# Patient Record
Sex: FEMALE | Race: WHITE | NOT HISPANIC OR LATINO | Employment: FULL TIME | URBAN - METROPOLITAN AREA
[De-identification: names, ages, dates, MRNs, and addresses within clinical notes are randomized per-mention and may not be internally consistent; named-entity substitution may affect disease eponyms.]

---

## 2017-03-29 ENCOUNTER — OFFICE VISIT - HEALTHEAST (OUTPATIENT)
Dept: MIDWIFE SERVICES | Facility: CLINIC | Age: 32
End: 2017-03-29

## 2017-03-29 DIAGNOSIS — Z3A.01 LESS THAN 8 WEEKS GESTATION OF PREGNANCY: ICD-10-CM

## 2017-03-29 DIAGNOSIS — N92.6 MISSED MENSES: ICD-10-CM

## 2017-03-29 ASSESSMENT — MIFFLIN-ST. JEOR: SCORE: 1211.48

## 2017-03-30 ENCOUNTER — COMMUNICATION - HEALTHEAST (OUTPATIENT)
Dept: MIDWIFE SERVICES | Facility: CLINIC | Age: 32
End: 2017-03-30

## 2017-03-30 ENCOUNTER — AMBULATORY - HEALTHEAST (OUTPATIENT)
Dept: MIDWIFE SERVICES | Facility: CLINIC | Age: 32
End: 2017-03-30

## 2017-03-30 ENCOUNTER — COMMUNICATION - HEALTHEAST (OUTPATIENT)
Dept: ADMINISTRATIVE | Facility: CLINIC | Age: 32
End: 2017-03-30

## 2017-03-30 DIAGNOSIS — Z34.90 PREGNANT: ICD-10-CM

## 2017-04-07 ENCOUNTER — AMBULATORY - HEALTHEAST (OUTPATIENT)
Dept: MIDWIFE SERVICES | Facility: CLINIC | Age: 32
End: 2017-04-07

## 2017-04-07 ENCOUNTER — HOSPITAL ENCOUNTER (OUTPATIENT)
Dept: ULTRASOUND IMAGING | Facility: CLINIC | Age: 32
Discharge: HOME | End: 2017-04-07
Attending: ADVANCED PRACTICE MIDWIFE

## 2017-04-07 ENCOUNTER — COMMUNICATION - HEALTHEAST (OUTPATIENT)
Dept: MIDWIFE SERVICES | Facility: CLINIC | Age: 32
End: 2017-04-07

## 2017-04-07 DIAGNOSIS — Z34.90 EARLY STAGE OF PREGNANCY: ICD-10-CM

## 2017-04-07 DIAGNOSIS — Z34.90 PREGNANT: ICD-10-CM

## 2017-04-07 DIAGNOSIS — Z33.1 PREGNANT STATE, INCIDENTAL: ICD-10-CM

## 2017-04-11 ENCOUNTER — AMBULATORY - HEALTHEAST (OUTPATIENT)
Dept: LAB | Facility: CLINIC | Age: 32
End: 2017-04-11

## 2017-04-11 ENCOUNTER — AMBULATORY - HEALTHEAST (OUTPATIENT)
Dept: MIDWIFE SERVICES | Facility: CLINIC | Age: 32
End: 2017-04-11

## 2017-04-11 ENCOUNTER — COMMUNICATION - HEALTHEAST (OUTPATIENT)
Dept: MIDWIFE SERVICES | Facility: CLINIC | Age: 32
End: 2017-04-11

## 2017-04-11 DIAGNOSIS — O20.9 VAGINAL BLEEDING IN PREGNANCY, FIRST TRIMESTER: ICD-10-CM

## 2017-04-14 ENCOUNTER — COMMUNICATION - HEALTHEAST (OUTPATIENT)
Dept: OBGYN | Facility: CLINIC | Age: 32
End: 2017-04-14

## 2017-04-17 ENCOUNTER — AMBULATORY - HEALTHEAST (OUTPATIENT)
Dept: LAB | Facility: CLINIC | Age: 32
End: 2017-04-17

## 2017-04-17 ENCOUNTER — COMMUNICATION - HEALTHEAST (OUTPATIENT)
Dept: OBGYN | Facility: CLINIC | Age: 32
End: 2017-04-17

## 2017-04-17 ENCOUNTER — HOSPITAL ENCOUNTER (OUTPATIENT)
Dept: ULTRASOUND IMAGING | Facility: CLINIC | Age: 32
Discharge: HOME | End: 2017-04-17
Attending: ADVANCED PRACTICE MIDWIFE

## 2017-04-17 ENCOUNTER — AMBULATORY - HEALTHEAST (OUTPATIENT)
Dept: OBGYN | Facility: CLINIC | Age: 32
End: 2017-04-17

## 2017-04-17 DIAGNOSIS — O02.1 MISSED ABORTION: ICD-10-CM

## 2017-04-17 DIAGNOSIS — Z34.90 EARLY STAGE OF PREGNANCY: ICD-10-CM

## 2017-04-17 DIAGNOSIS — Z33.1 PREGNANT STATE, INCIDENTAL: ICD-10-CM

## 2017-04-21 ENCOUNTER — COMMUNICATION - HEALTHEAST (OUTPATIENT)
Dept: MIDWIFE SERVICES | Facility: CLINIC | Age: 32
End: 2017-04-21

## 2017-04-22 ENCOUNTER — COMMUNICATION - HEALTHEAST (OUTPATIENT)
Dept: OBGYN | Facility: CLINIC | Age: 32
End: 2017-04-22

## 2017-04-27 ENCOUNTER — AMBULATORY - HEALTHEAST (OUTPATIENT)
Dept: LAB | Facility: CLINIC | Age: 32
End: 2017-04-27

## 2017-04-27 DIAGNOSIS — O20.9 VAGINAL BLEEDING IN PREGNANCY, FIRST TRIMESTER: ICD-10-CM

## 2017-04-29 ENCOUNTER — COMMUNICATION - HEALTHEAST (OUTPATIENT)
Dept: MIDWIFE SERVICES | Facility: CLINIC | Age: 32
End: 2017-04-29

## 2017-05-04 ENCOUNTER — AMBULATORY - HEALTHEAST (OUTPATIENT)
Dept: LAB | Facility: CLINIC | Age: 32
End: 2017-05-04

## 2017-05-04 DIAGNOSIS — O02.1 MISSED ABORTION: ICD-10-CM

## 2017-05-05 ENCOUNTER — COMMUNICATION - HEALTHEAST (OUTPATIENT)
Dept: MIDWIFE SERVICES | Facility: CLINIC | Age: 32
End: 2017-05-05

## 2017-05-05 ENCOUNTER — COMMUNICATION - HEALTHEAST (OUTPATIENT)
Dept: OBGYN | Facility: CLINIC | Age: 32
End: 2017-05-05

## 2017-05-06 ENCOUNTER — COMMUNICATION - HEALTHEAST (OUTPATIENT)
Dept: OBGYN | Facility: CLINIC | Age: 32
End: 2017-05-06

## 2017-05-06 ENCOUNTER — AMBULATORY - HEALTHEAST (OUTPATIENT)
Dept: OBGYN | Facility: CLINIC | Age: 32
End: 2017-05-06

## 2017-05-08 ENCOUNTER — COMMUNICATION - HEALTHEAST (OUTPATIENT)
Dept: OBGYN | Facility: CLINIC | Age: 32
End: 2017-05-08

## 2017-05-08 ENCOUNTER — AMBULATORY - HEALTHEAST (OUTPATIENT)
Dept: OBGYN | Facility: CLINIC | Age: 32
End: 2017-05-08

## 2017-05-08 DIAGNOSIS — O03.9 MISCARRIAGE: ICD-10-CM

## 2017-05-12 ENCOUNTER — AMBULATORY - HEALTHEAST (OUTPATIENT)
Dept: LAB | Facility: CLINIC | Age: 32
End: 2017-05-12

## 2017-05-12 DIAGNOSIS — O03.9 MISCARRIAGE: ICD-10-CM

## 2017-05-23 ENCOUNTER — AMBULATORY - HEALTHEAST (OUTPATIENT)
Dept: OBGYN | Facility: CLINIC | Age: 32
End: 2017-05-23

## 2017-05-23 DIAGNOSIS — O03.9 MISCARRIAGE: ICD-10-CM

## 2017-05-26 ENCOUNTER — AMBULATORY - HEALTHEAST (OUTPATIENT)
Dept: LAB | Facility: CLINIC | Age: 32
End: 2017-05-26

## 2017-05-26 ENCOUNTER — OFFICE VISIT - HEALTHEAST (OUTPATIENT)
Dept: MIDWIFE SERVICES | Facility: CLINIC | Age: 32
End: 2017-05-26

## 2017-05-26 ENCOUNTER — COMMUNICATION - HEALTHEAST (OUTPATIENT)
Dept: MIDWIFE SERVICES | Facility: CLINIC | Age: 32
End: 2017-05-26

## 2017-05-26 DIAGNOSIS — O03.9 MISCARRIAGE: ICD-10-CM

## 2017-05-26 ASSESSMENT — MIFFLIN-ST. JEOR: SCORE: 1216.59

## 2017-07-31 ENCOUNTER — COMMUNICATION - HEALTHEAST (OUTPATIENT)
Dept: OBGYN | Facility: CLINIC | Age: 32
End: 2017-07-31

## 2017-08-29 ENCOUNTER — COMMUNICATION - HEALTHEAST (OUTPATIENT)
Dept: OBGYN | Facility: CLINIC | Age: 32
End: 2017-08-29

## 2017-10-18 ENCOUNTER — COMMUNICATION - HEALTHEAST (OUTPATIENT)
Dept: OBGYN | Facility: CLINIC | Age: 32
End: 2017-10-18

## 2017-10-19 ENCOUNTER — OFFICE VISIT - HEALTHEAST (OUTPATIENT)
Dept: MIDWIFE SERVICES | Facility: CLINIC | Age: 32
End: 2017-10-19

## 2017-10-19 DIAGNOSIS — O20.9 VAGINAL BLEEDING IN PREGNANCY, FIRST TRIMESTER: ICD-10-CM

## 2017-10-19 DIAGNOSIS — Z32.01 PREGNANCY EXAMINATION OR TEST, POSITIVE RESULT: ICD-10-CM

## 2017-10-19 DIAGNOSIS — N92.6 MISSED MENSES: ICD-10-CM

## 2017-10-19 DIAGNOSIS — N92.6 IRREGULAR MENSES: ICD-10-CM

## 2017-10-19 ASSESSMENT — MIFFLIN-ST. JEOR: SCORE: 1207.52

## 2017-10-22 ENCOUNTER — COMMUNICATION - HEALTHEAST (OUTPATIENT)
Dept: MIDWIFE SERVICES | Facility: CLINIC | Age: 32
End: 2017-10-22

## 2017-10-26 ENCOUNTER — HOSPITAL ENCOUNTER (OUTPATIENT)
Dept: ULTRASOUND IMAGING | Facility: CLINIC | Age: 32
Discharge: HOME | End: 2017-10-26
Attending: ADVANCED PRACTICE MIDWIFE

## 2017-10-26 DIAGNOSIS — O20.9 VAGINAL BLEEDING IN PREGNANCY, FIRST TRIMESTER: ICD-10-CM

## 2017-10-26 DIAGNOSIS — N92.6 IRREGULAR MENSES: ICD-10-CM

## 2017-10-26 DIAGNOSIS — N92.6 MISSED MENSES: ICD-10-CM

## 2017-10-26 DIAGNOSIS — Z32.01 PREGNANCY EXAMINATION OR TEST, POSITIVE RESULT: ICD-10-CM

## 2017-10-30 ENCOUNTER — COMMUNICATION - HEALTHEAST (OUTPATIENT)
Dept: MIDWIFE SERVICES | Facility: CLINIC | Age: 32
End: 2017-10-30

## 2017-11-09 ENCOUNTER — PRENATAL OFFICE VISIT - HEALTHEAST (OUTPATIENT)
Dept: MIDWIFE SERVICES | Facility: CLINIC | Age: 32
End: 2017-11-09

## 2017-11-09 ENCOUNTER — COMMUNICATION - HEALTHEAST (OUTPATIENT)
Dept: MIDWIFE SERVICES | Facility: CLINIC | Age: 32
End: 2017-11-09

## 2017-11-09 DIAGNOSIS — Z34.00 SUPERVISION OF NORMAL FIRST PREGNANCY: ICD-10-CM

## 2017-11-09 DIAGNOSIS — Z34.01 ENCOUNTER FOR SUPERVISION OF NORMAL FIRST PREGNANCY IN FIRST TRIMESTER: ICD-10-CM

## 2017-11-09 DIAGNOSIS — N93.0 PCB (POST COITAL BLEEDING): ICD-10-CM

## 2017-11-09 LAB — HIV 1+2 AB+HIV1 P24 AG SERPL QL IA: NEGATIVE

## 2017-11-09 ASSESSMENT — MIFFLIN-ST. JEOR: SCORE: 1216.59

## 2017-11-10 LAB
HBV SURFACE AG SERPL QL IA: NEGATIVE
HCV AB SERPL QL IA: NEGATIVE
SYPHILIS RPR SCREEN - HISTORICAL: NORMAL

## 2017-12-12 ENCOUNTER — PRENATAL OFFICE VISIT - HEALTHEAST (OUTPATIENT)
Dept: MIDWIFE SERVICES | Facility: CLINIC | Age: 32
End: 2017-12-12

## 2017-12-12 DIAGNOSIS — Z34.01 ENCOUNTER FOR SUPERVISION OF NORMAL FIRST PREGNANCY IN FIRST TRIMESTER: ICD-10-CM

## 2017-12-12 DIAGNOSIS — Z00.00 HEALTHCARE MAINTENANCE: ICD-10-CM

## 2017-12-12 ASSESSMENT — MIFFLIN-ST. JEOR: SCORE: 1223.39

## 2018-01-10 ENCOUNTER — COMMUNICATION - HEALTHEAST (OUTPATIENT)
Dept: ADMINISTRATIVE | Facility: CLINIC | Age: 33
End: 2018-01-10

## 2018-01-11 ENCOUNTER — PRENATAL OFFICE VISIT - HEALTHEAST (OUTPATIENT)
Dept: MIDWIFE SERVICES | Facility: CLINIC | Age: 33
End: 2018-01-11

## 2018-01-11 DIAGNOSIS — Z34.01 ENCOUNTER FOR SUPERVISION OF NORMAL FIRST PREGNANCY IN FIRST TRIMESTER: ICD-10-CM

## 2018-01-11 ASSESSMENT — MIFFLIN-ST. JEOR: SCORE: 1248.34

## 2018-01-22 ENCOUNTER — COMMUNICATION - HEALTHEAST (OUTPATIENT)
Dept: OBGYN | Facility: CLINIC | Age: 33
End: 2018-01-22

## 2018-01-22 ENCOUNTER — HOSPITAL ENCOUNTER (OUTPATIENT)
Dept: ULTRASOUND IMAGING | Facility: CLINIC | Age: 33
Discharge: HOME | End: 2018-01-22
Attending: ADVANCED PRACTICE MIDWIFE

## 2018-01-22 ENCOUNTER — AMBULATORY - HEALTHEAST (OUTPATIENT)
Dept: OBGYN | Facility: CLINIC | Age: 33
End: 2018-01-22

## 2018-01-22 ENCOUNTER — AMBULATORY - HEALTHEAST (OUTPATIENT)
Dept: MIDWIFE SERVICES | Facility: CLINIC | Age: 33
End: 2018-01-22

## 2018-01-22 DIAGNOSIS — Z34.01 ENCOUNTER FOR SUPERVISION OF NORMAL FIRST PREGNANCY IN FIRST TRIMESTER: ICD-10-CM

## 2018-02-01 ENCOUNTER — PRENATAL OFFICE VISIT - HEALTHEAST (OUTPATIENT)
Dept: MIDWIFE SERVICES | Facility: CLINIC | Age: 33
End: 2018-02-01

## 2018-02-01 ENCOUNTER — COMMUNICATION - HEALTHEAST (OUTPATIENT)
Dept: OBGYN | Facility: CLINIC | Age: 33
End: 2018-02-01

## 2018-02-01 DIAGNOSIS — Z34.01 ENCOUNTER FOR SUPERVISION OF NORMAL FIRST PREGNANCY IN FIRST TRIMESTER: ICD-10-CM
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
## 2018-02-01 ASSESSMENT — MIFFLIN-ST. JEOR: SCORE: 1261.95

## 2018-02-04 ENCOUNTER — COMMUNICATION - HEALTHEAST (OUTPATIENT)
Dept: OBGYN | Facility: CLINIC | Age: 33
End: 2018-02-04

## 2018-02-04 ENCOUNTER — AMBULATORY - HEALTHEAST (OUTPATIENT)
Dept: OBGYN | Facility: CLINIC | Age: 33
End: 2018-02-04

## 2018-02-04 ENCOUNTER — HOSPITAL ENCOUNTER (OUTPATIENT)
Dept: MEDSURG UNIT | Facility: CLINIC | Age: 33
Discharge: HOME | End: 2018-02-04
Attending: ADVANCED PRACTICE MIDWIFE | Admitting: ADVANCED PRACTICE MIDWIFE

## 2018-02-04 DIAGNOSIS — B96.89 BACTERIAL VAGINOSIS: ICD-10-CM

## 2018-02-04 DIAGNOSIS — N76.0 BACTERIAL VAGINOSIS: ICD-10-CM

## 2018-02-04 LAB
CLUE CELLS: ABNORMAL
ERYTHROCYTE [DISTWIDTH] IN BLOOD BY AUTOMATED COUNT: 13.3 % (ref 11–14.5)
FETAL RBC % LFV: 1.6 %
HCT VFR BLD AUTO: 35.8 % (ref 35–47)
HGB BLD-MCNC: 12.7 G/DL (ref 12–16)
MCH RBC QN AUTO: 32.4 PG (ref 27–34)
MCHC RBC AUTO-ENTMCNC: 35.5 G/DL (ref 32–36)
MCV RBC AUTO: 91 FL (ref 80–100)
PLATELET # BLD AUTO: 240 THOU/UL (ref 140–440)
PMV BLD AUTO: 9 FL (ref 8.5–12.5)
RBC # BLD AUTO: 3.92 MILL/UL (ref 3.8–5.4)
TRICHOMONAS, WET PREP: ABNORMAL
WBC: 9 THOU/UL (ref 4–11)
YEAST, WET PREP: ABNORMAL

## 2018-02-05 ENCOUNTER — COMMUNICATION - HEALTHEAST (OUTPATIENT)
Dept: TELEHEALTH | Facility: CLINIC | Age: 33
End: 2018-02-05

## 2018-02-07 ENCOUNTER — PRENATAL OFFICE VISIT - HEALTHEAST (OUTPATIENT)
Dept: MIDWIFE SERVICES | Facility: CLINIC | Age: 33
End: 2018-02-07

## 2018-02-07 DIAGNOSIS — Z34.01 ENCOUNTER FOR SUPERVISION OF NORMAL FIRST PREGNANCY IN FIRST TRIMESTER: ICD-10-CM
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
## 2018-02-07 ASSESSMENT — MIFFLIN-ST. JEOR: SCORE: 1273.29

## 2018-02-16 ENCOUNTER — COMMUNICATION - HEALTHEAST (OUTPATIENT)
Dept: OBGYN | Facility: CLINIC | Age: 33
End: 2018-02-16

## 2018-03-02 ENCOUNTER — COMMUNICATION - HEALTHEAST (OUTPATIENT)
Dept: OBGYN | Facility: CLINIC | Age: 33
End: 2018-03-02

## 2018-03-02 ENCOUNTER — PRENATAL OFFICE VISIT - HEALTHEAST (OUTPATIENT)
Dept: MIDWIFE SERVICES | Facility: CLINIC | Age: 33
End: 2018-03-02

## 2018-03-02 DIAGNOSIS — O26.892 DYSURIA DURING PREGNANCY IN SECOND TRIMESTER: ICD-10-CM

## 2018-03-02 DIAGNOSIS — R30.0 DYSURIA DURING PREGNANCY IN SECOND TRIMESTER: ICD-10-CM

## 2018-03-02 DIAGNOSIS — R30.0 DYSURIA DURING PREGNANCY: ICD-10-CM

## 2018-03-02 DIAGNOSIS — O26.899 DYSURIA DURING PREGNANCY: ICD-10-CM

## 2018-03-02 LAB
ALBUMIN UR-MCNC: NEGATIVE MG/DL
APPEARANCE UR: CLEAR
BILIRUB UR QL STRIP: NEGATIVE
COLOR UR AUTO: YELLOW
GLUCOSE UR STRIP-MCNC: NEGATIVE MG/DL
HGB UR QL STRIP: NEGATIVE
KETONES UR STRIP-MCNC: NEGATIVE MG/DL
LEUKOCYTE ESTERASE UR QL STRIP: NEGATIVE
NITRATE UR QL: NEGATIVE
PH UR STRIP: 6 [PH] (ref 5–8)
SP GR UR STRIP: 1.01 (ref 1–1.03)
UROBILINOGEN UR STRIP-ACNC: NORMAL

## 2018-03-02 ASSESSMENT — MIFFLIN-ST. JEOR: SCORE: 1284.63

## 2018-03-04 ENCOUNTER — COMMUNICATION - HEALTHEAST (OUTPATIENT)
Dept: MIDWIFE SERVICES | Facility: CLINIC | Age: 33
End: 2018-03-04

## 2018-03-05 ENCOUNTER — HOSPITAL ENCOUNTER (OUTPATIENT)
Dept: ULTRASOUND IMAGING | Facility: CLINIC | Age: 33
Discharge: HOME | End: 2018-03-05
Attending: ADVANCED PRACTICE MIDWIFE

## 2018-03-05 DIAGNOSIS — Z34.01 ENCOUNTER FOR SUPERVISION OF NORMAL FIRST PREGNANCY IN FIRST TRIMESTER: ICD-10-CM
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
## 2018-03-06 ENCOUNTER — AMBULATORY - HEALTHEAST (OUTPATIENT)
Dept: MIDWIFE SERVICES | Facility: CLINIC | Age: 33
End: 2018-03-06

## 2018-03-06 ENCOUNTER — PRENATAL OFFICE VISIT - HEALTHEAST (OUTPATIENT)
Dept: MIDWIFE SERVICES | Facility: CLINIC | Age: 33
End: 2018-03-06

## 2018-03-06 DIAGNOSIS — Z34.01 ENCOUNTER FOR SUPERVISION OF NORMAL FIRST PREGNANCY IN FIRST TRIMESTER: ICD-10-CM

## 2018-03-06 ASSESSMENT — MIFFLIN-ST. JEOR: SCORE: 1289.17

## 2018-03-08 ENCOUNTER — COMMUNICATION - HEALTHEAST (OUTPATIENT)
Dept: TELEHEALTH | Facility: CLINIC | Age: 33
End: 2018-03-08

## 2018-03-29 ENCOUNTER — PRENATAL OFFICE VISIT - HEALTHEAST (OUTPATIENT)
Dept: MIDWIFE SERVICES | Facility: CLINIC | Age: 33
End: 2018-03-29

## 2018-03-29 DIAGNOSIS — Z34.03 SUPERVISION OF NORMAL FIRST PREGNANCY IN THIRD TRIMESTER: ICD-10-CM

## 2018-03-29 LAB
FASTING STATUS PATIENT QL REPORTED: NO
GLUCOSE 1H P 50 G GLC PO SERPL-MCNC: 101 MG/DL (ref 70–139)
HGB BLD-MCNC: 12.7 G/DL (ref 12–16)

## 2018-03-29 ASSESSMENT — MIFFLIN-ST. JEOR: SCORE: 1307.31

## 2018-04-18 ENCOUNTER — PRENATAL OFFICE VISIT - HEALTHEAST (OUTPATIENT)
Dept: MIDWIFE SERVICES | Facility: CLINIC | Age: 33
End: 2018-04-18

## 2018-04-18 DIAGNOSIS — Z34.03 ENCOUNTER FOR SUPERVISION OF NORMAL FIRST PREGNANCY IN THIRD TRIMESTER: ICD-10-CM

## 2018-04-18 ASSESSMENT — MIFFLIN-ST. JEOR: SCORE: 1320.92

## 2018-05-04 ENCOUNTER — PRENATAL OFFICE VISIT - HEALTHEAST (OUTPATIENT)
Dept: MIDWIFE SERVICES | Facility: CLINIC | Age: 33
End: 2018-05-04

## 2018-05-04 DIAGNOSIS — O32.0XX0 UNSTABLE FETAL LIE, SINGLE OR UNSPECIFIED FETUS: ICD-10-CM
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
## 2018-05-04 DIAGNOSIS — Z34.01 ENCOUNTER FOR SUPERVISION OF NORMAL FIRST PREGNANCY IN FIRST TRIMESTER: ICD-10-CM

## 2018-05-04 ASSESSMENT — MIFFLIN-ST. JEOR: SCORE: 1327.72

## 2018-05-17 ENCOUNTER — COMMUNICATION - HEALTHEAST (OUTPATIENT)
Dept: MIDWIFE SERVICES | Facility: CLINIC | Age: 33
End: 2018-05-17

## 2018-05-17 ENCOUNTER — PRENATAL OFFICE VISIT - HEALTHEAST (OUTPATIENT)
Dept: MIDWIFE SERVICES | Facility: CLINIC | Age: 33
End: 2018-05-17

## 2018-05-17 DIAGNOSIS — Z34.03 ENCOUNTER FOR SUPERVISION OF NORMAL FIRST PREGNANCY IN THIRD TRIMESTER: ICD-10-CM

## 2018-05-17 LAB — HGB BLD-MCNC: 12.7 G/DL (ref 12–16)

## 2018-05-17 ASSESSMENT — MIFFLIN-ST. JEOR: SCORE: 1348.13

## 2018-05-18 ENCOUNTER — COMMUNICATION - HEALTHEAST (OUTPATIENT)
Dept: OBGYN | Facility: CLINIC | Age: 33
End: 2018-05-18

## 2018-05-18 LAB
ALLERGIC TO PENICILLIN: YES
GP B STREP DNA SPEC QL NAA+PROBE: NEGATIVE

## 2018-05-25 ENCOUNTER — PRENATAL OFFICE VISIT - HEALTHEAST (OUTPATIENT)
Dept: MIDWIFE SERVICES | Facility: CLINIC | Age: 33
End: 2018-05-25

## 2018-05-25 DIAGNOSIS — Z34.01 ENCOUNTER FOR SUPERVISION OF NORMAL FIRST PREGNANCY IN FIRST TRIMESTER: ICD-10-CM

## 2018-05-25 ASSESSMENT — MIFFLIN-ST. JEOR: SCORE: 1339.52

## 2018-05-29 ENCOUNTER — COMMUNICATION - HEALTHEAST (OUTPATIENT)
Dept: ADMINISTRATIVE | Facility: CLINIC | Age: 33
End: 2018-05-29

## 2018-05-31 ENCOUNTER — PRENATAL OFFICE VISIT - HEALTHEAST (OUTPATIENT)
Dept: MIDWIFE SERVICES | Facility: CLINIC | Age: 33
End: 2018-05-31

## 2018-05-31 DIAGNOSIS — Z34.01 ENCOUNTER FOR SUPERVISION OF NORMAL FIRST PREGNANCY IN FIRST TRIMESTER: ICD-10-CM

## 2018-05-31 ASSESSMENT — MIFFLIN-ST. JEOR: SCORE: 1352.67

## 2018-06-07 ENCOUNTER — PRENATAL OFFICE VISIT - HEALTHEAST (OUTPATIENT)
Dept: MIDWIFE SERVICES | Facility: CLINIC | Age: 33
End: 2018-06-07

## 2018-06-07 DIAGNOSIS — Z34.01 ENCOUNTER FOR SUPERVISION OF NORMAL FIRST PREGNANCY IN FIRST TRIMESTER: ICD-10-CM

## 2018-06-07 ASSESSMENT — MIFFLIN-ST. JEOR: SCORE: 1357.21

## 2018-06-14 ENCOUNTER — PRENATAL OFFICE VISIT - HEALTHEAST (OUTPATIENT)
Dept: MIDWIFE SERVICES | Facility: CLINIC | Age: 33
End: 2018-06-14

## 2018-06-14 DIAGNOSIS — O48.0 POST-TERM PREGNANCY, 40-42 WEEKS OF GESTATION: ICD-10-CM

## 2018-06-14 ASSESSMENT — MIFFLIN-ST. JEOR: SCORE: 1361.74

## 2018-06-20 ENCOUNTER — COMMUNICATION - HEALTHEAST (OUTPATIENT)
Dept: OBGYN | Facility: CLINIC | Age: 33
End: 2018-06-20

## 2018-06-21 ENCOUNTER — PRENATAL OFFICE VISIT - HEALTHEAST (OUTPATIENT)
Dept: MIDWIFE SERVICES | Facility: CLINIC | Age: 33
End: 2018-06-21

## 2018-06-21 ENCOUNTER — HOSPITAL ENCOUNTER (OUTPATIENT)
Dept: ULTRASOUND IMAGING | Facility: CLINIC | Age: 33
Discharge: HOME | End: 2018-06-21
Attending: ADVANCED PRACTICE MIDWIFE

## 2018-06-21 ENCOUNTER — ANESTHESIA - HEALTHEAST (OUTPATIENT)
Dept: OBGYN | Facility: CLINIC | Age: 33
End: 2018-06-21

## 2018-06-21 ENCOUNTER — COMMUNICATION - HEALTHEAST (OUTPATIENT)
Dept: MIDWIFE SERVICES | Facility: CLINIC | Age: 33
End: 2018-06-21

## 2018-06-21 DIAGNOSIS — O48.0 POST-TERM PREGNANCY, 40-42 WEEKS OF GESTATION: ICD-10-CM

## 2018-06-21 DIAGNOSIS — Z34.01 ENCOUNTER FOR SUPERVISION OF NORMAL FIRST PREGNANCY IN FIRST TRIMESTER: ICD-10-CM

## 2018-06-21 ASSESSMENT — MIFFLIN-ST. JEOR
SCORE: 1361.74
SCORE: 1361.74

## 2018-06-22 ENCOUNTER — HOME CARE/HOSPICE - HEALTHEAST (OUTPATIENT)
Dept: HOME HEALTH SERVICES | Facility: HOME HEALTH | Age: 33
End: 2018-06-22

## 2018-06-22 ENCOUNTER — SURGERY - HEALTHEAST (OUTPATIENT)
Dept: OBGYN | Facility: CLINIC | Age: 33
End: 2018-06-22

## 2018-06-26 ENCOUNTER — HOME CARE/HOSPICE - HEALTHEAST (OUTPATIENT)
Dept: HOME HEALTH SERVICES | Facility: HOME HEALTH | Age: 33
End: 2018-06-26

## 2018-08-02 ENCOUNTER — OFFICE VISIT - HEALTHEAST (OUTPATIENT)
Dept: MIDWIFE SERVICES | Facility: CLINIC | Age: 33
End: 2018-08-02

## 2018-08-02 ASSESSMENT — MIFFLIN-ST. JEOR: SCORE: 1261.95

## 2018-11-21 ENCOUNTER — COMMUNICATION - HEALTHEAST (OUTPATIENT)
Dept: ADMINISTRATIVE | Facility: CLINIC | Age: 33
End: 2018-11-21

## 2018-11-29 ENCOUNTER — OFFICE VISIT - HEALTHEAST (OUTPATIENT)
Dept: FAMILY MEDICINE | Facility: CLINIC | Age: 33
End: 2018-11-29

## 2018-11-29 DIAGNOSIS — G56.02 CARPAL TUNNEL SYNDROME OF LEFT WRIST: ICD-10-CM

## 2018-11-29 DIAGNOSIS — N63.20 LEFT BREAST MASS: ICD-10-CM

## 2018-11-29 DIAGNOSIS — R42 LIGHTHEADEDNESS: ICD-10-CM

## 2018-11-29 DIAGNOSIS — M22.2X2 PATELLOFEMORAL PAIN SYNDROME OF LEFT KNEE: ICD-10-CM

## 2018-11-29 LAB
ANION GAP SERPL CALCULATED.3IONS-SCNC: 12 MMOL/L (ref 5–18)
BUN SERPL-MCNC: 11 MG/DL (ref 8–22)
CALCIUM SERPL-MCNC: 10.4 MG/DL (ref 8.5–10.5)
CHLORIDE BLD-SCNC: 106 MMOL/L (ref 98–107)
CO2 SERPL-SCNC: 26 MMOL/L (ref 22–31)
CREAT SERPL-MCNC: 0.72 MG/DL (ref 0.6–1.1)
ERYTHROCYTE [DISTWIDTH] IN BLOOD BY AUTOMATED COUNT: 11.2 % (ref 11–14.5)
FERRITIN SERPL-MCNC: 80 NG/ML (ref 10–130)
GFR SERPL CREATININE-BSD FRML MDRD: >60 ML/MIN/1.73M2
GLUCOSE BLD-MCNC: 77 MG/DL (ref 70–125)
HCT VFR BLD AUTO: 45.5 % (ref 35–47)
HGB BLD-MCNC: 15.2 G/DL (ref 12–16)
MCH RBC QN AUTO: 30.2 PG (ref 27–34)
MCHC RBC AUTO-ENTMCNC: 33.3 G/DL (ref 32–36)
MCV RBC AUTO: 91 FL (ref 80–100)
PLATELET # BLD AUTO: 337 THOU/UL (ref 140–440)
PMV BLD AUTO: 6.8 FL (ref 7–10)
POTASSIUM BLD-SCNC: 4.5 MMOL/L (ref 3.5–5)
RBC # BLD AUTO: 5.02 MILL/UL (ref 3.8–5.4)
SODIUM SERPL-SCNC: 144 MMOL/L (ref 136–145)
TSH SERPL DL<=0.005 MIU/L-ACNC: 0.36 UIU/ML (ref 0.3–5)
WBC: 7.8 THOU/UL (ref 4–11)

## 2018-12-03 ENCOUNTER — HOSPITAL ENCOUNTER (OUTPATIENT)
Dept: ULTRASOUND IMAGING | Facility: CLINIC | Age: 33
Discharge: HOME | End: 2018-12-03
Attending: NURSE PRACTITIONER

## 2018-12-03 DIAGNOSIS — N63.20 LEFT BREAST MASS: ICD-10-CM

## 2018-12-07 ENCOUNTER — RECORDS - HEALTHEAST (OUTPATIENT)
Dept: ADMINISTRATIVE | Facility: OTHER | Age: 33
End: 2018-12-07

## 2019-04-22 ENCOUNTER — OFFICE VISIT - HEALTHEAST (OUTPATIENT)
Dept: MIDWIFE SERVICES | Facility: CLINIC | Age: 34
End: 2019-04-22

## 2019-04-22 DIAGNOSIS — O92.29 PAIN OF BREAST DURING BREASTFEEDING: ICD-10-CM

## 2019-04-22 ASSESSMENT — MIFFLIN-ST. JEOR: SCORE: 1216.59

## 2019-08-30 ENCOUNTER — OFFICE VISIT - HEALTHEAST (OUTPATIENT)
Dept: MIDWIFE SERVICES | Facility: CLINIC | Age: 34
End: 2019-08-30

## 2019-08-30 ENCOUNTER — TRANSFERRED RECORDS (OUTPATIENT)
Dept: MULTI SPECIALTY CLINIC | Facility: CLINIC | Age: 34
End: 2019-08-30

## 2019-08-30 DIAGNOSIS — Z00.00 ROUTINE GENERAL MEDICAL EXAMINATION AT A HEALTH CARE FACILITY: ICD-10-CM

## 2019-08-30 LAB
CHOLEST SERPL-MCNC: 162 MG/DL
FASTING STATUS PATIENT QL REPORTED: YES
FASTING STATUS PATIENT QL REPORTED: YES
GLUCOSE BLD-MCNC: 80 MG/DL (ref 70–99)
HDLC SERPL-MCNC: 65 MG/DL
HPV ABSTRACT: NORMAL
LDLC SERPL CALC-MCNC: 81 MG/DL
PAP-ABSTRACT: NORMAL
TRIGL SERPL-MCNC: 79 MG/DL

## 2019-08-30 ASSESSMENT — MIFFLIN-ST. JEOR: SCORE: 1216.59

## 2019-09-03 LAB
HPV SOURCE: NORMAL
HUMAN PAPILLOMA VIRUS 16 DNA: NEGATIVE
HUMAN PAPILLOMA VIRUS 18 DNA: NEGATIVE
HUMAN PAPILLOMA VIRUS FINAL DIAGNOSIS: NORMAL
HUMAN PAPILLOMA VIRUS OTHER HR: NEGATIVE
SPECIMEN DESCRIPTION: NORMAL

## 2019-10-31 ENCOUNTER — OFFICE VISIT - HEALTHEAST (OUTPATIENT)
Dept: MIDWIFE SERVICES | Facility: CLINIC | Age: 34
End: 2019-10-31

## 2019-10-31 DIAGNOSIS — R30.0 DYSURIA: ICD-10-CM

## 2019-10-31 DIAGNOSIS — N92.6 MISSED MENSES: ICD-10-CM

## 2019-10-31 DIAGNOSIS — R39.15 URINARY URGENCY: ICD-10-CM

## 2019-10-31 DIAGNOSIS — Z23 NEED FOR IMMUNIZATION AGAINST INFLUENZA: ICD-10-CM

## 2019-10-31 LAB
ALBUMIN UR-MCNC: NEGATIVE MG/DL
APPEARANCE UR: CLEAR
BACTERIA #/AREA URNS HPF: ABNORMAL HPF
BILIRUB UR QL STRIP: NEGATIVE
CLUE CELLS: ABNORMAL
COLOR UR AUTO: YELLOW
GLUCOSE UR STRIP-MCNC: NEGATIVE MG/DL
HCG UR QL: NEGATIVE
HGB UR QL STRIP: NEGATIVE
KETONES UR STRIP-MCNC: NEGATIVE MG/DL
LEUKOCYTE ESTERASE UR QL STRIP: ABNORMAL
NITRATE UR QL: NEGATIVE
PH UR STRIP: 7 [PH] (ref 5–8)
RBC #/AREA URNS AUTO: ABNORMAL HPF
SP GR UR STRIP: 1.01 (ref 1–1.03)
SQUAMOUS #/AREA URNS AUTO: ABNORMAL LPF
TRICHOMONAS, WET PREP: ABNORMAL
UROBILINOGEN UR STRIP-ACNC: ABNORMAL
WBC #/AREA URNS AUTO: ABNORMAL HPF
YEAST #/AREA URNS HPF: ABNORMAL HPF
YEAST, WET PREP: ABNORMAL

## 2019-10-31 ASSESSMENT — MIFFLIN-ST. JEOR: SCORE: 1234.73

## 2019-11-01 LAB — BACTERIA SPEC CULT: NO GROWTH

## 2020-08-10 ENCOUNTER — COMMUNICATION - HEALTHEAST (OUTPATIENT)
Dept: MIDWIFE SERVICES | Facility: CLINIC | Age: 35
End: 2020-08-10

## 2020-11-12 ENCOUNTER — OFFICE VISIT - HEALTHEAST (OUTPATIENT)
Dept: FAMILY MEDICINE | Facility: CLINIC | Age: 35
End: 2020-11-12

## 2020-11-12 DIAGNOSIS — H92.01 RIGHT EAR PAIN: ICD-10-CM

## 2020-11-16 ENCOUNTER — OFFICE VISIT - HEALTHEAST (OUTPATIENT)
Dept: FAMILY MEDICINE | Facility: CLINIC | Age: 35
End: 2020-11-16

## 2020-11-16 DIAGNOSIS — H65.92 OME (OTITIS MEDIA WITH EFFUSION), LEFT: ICD-10-CM

## 2020-11-16 ASSESSMENT — MIFFLIN-ST. JEOR: SCORE: 1207.52

## 2021-03-26 NOTE — PROGRESS NOTES
Kasia is a 36 year old  female who presents for annual exam.     Besides routine health maintenance, patient c/o possible yeast infection. Patient was having some GI issues and now has some discharge and irritation.    HPI:    Patient reports having symptoms of possible vaginal infection.  She reports increased yellow odor and irritation.  She denies odor.  She is up-to-date on health maintenance other than needing cholesterol and glucose.    GYNECOLOGIC HISTORY:    Patient's last menstrual period was 2021.    Regular menses? yes  Menses every 35 days.  Length of menses: 5-6 days    Her current contraception method is: condoms.  She  reports that she has never smoked. She has never used smokeless tobacco.  Patient is sexually active.  STD testing offered?  Declined     Last PHQ-9 score on record =   PHQ-9 SCORE 3/29/2021   PHQ-9 Total Score 2     Last GAD7 score on record =   WENDY-7 SCORE 3/29/2021   Total Score 0     Alcohol Score = 3    HEALTH MAINTENANCE:  Cholesterol: (No results found for: CHOL fasting labs today  Last Mammo: N/A, Result: not applicable, Next Mammo: screen age 40  [facility]  Neg, Neg-HPV  Colonoscopy:  N/A, Result: not applicable, Next Colonoscopy: screen age 50  Dexa:  N/A  Health maintenance updated:  yes    HISTORY:  OB History   No obstetric history on file.       Patient Active Problem List   Diagnosis     H/O:      H/O abnormal cervical Papanicolaou smear     History reviewed. No pertinent surgical history.   Social History     Tobacco Use     Smoking status: Never Smoker     Smokeless tobacco: Never Used   Substance Use Topics     Alcohol use: Not on file      Problem (# of Occurrences) Relation (Name,Age of Onset)    Diabetes (1) Mother    Heart Disease (1) Maternal Grandmother    Hip fracture (2) Paternal Grandmother, Paternal Grandfather    Hyperlipidemia (1) Father    Hypertension (1) Father    Thyroid Disease (2) Father,  Paternal Grandmother            Current Outpatient Medications   Medication Sig     fluticasone (FLONASE) 50 MCG/ACT nasal spray Spray 1 spray into both nostrils daily     loratadine (CLARITIN) 10 MG tablet Take 10 mg by mouth daily     Multiple Vitamins-Minerals (ONCOVITE) TABS Take 1 tablet by mouth     omega 3 1000 MG CAPS      No current facility-administered medications for this visit.      Allergies   Allergen Reactions     Penicillins Hives       Past medical, surgical, social and family histories were reviewed and updated in EPIC.    ROS:   12 point review of systems negative other than symptoms noted below or in the HPI.  Genitourinary: Vaginal Discharge and Vaginal Irritation  No urinary frequency or dysuria, bladder or kidney problems, vaginal discharge, vaginal itching or burning    EXAM:  /60   LMP 03/01/2021    BMI: There is no height or weight on file to calculate BMI.    PHYSICAL EXAM:  Constitutional:   Appearance: Well nourished, well developed, alert, in no acute distress  Neck:  Lymph Nodes:  No lymphadenopathy present    Thyroid:  Gland size normal, nontender, no nodules or masses present  on palpation  Chest:  Respiratory Effort:  Breathing unlabored  Cardiovascular:    Heart: Auscultation:  Regular rate, normal rhythm, no murmurs present  Breasts: No rashes, dimpling, or retractions visualized.  Palpation of Breasts and Axillae:  No masses present on palpation, no breast tenderness., Axillary Lymph Nodes:  No lymphadenopathy present., No nodularity, asymmetry or nipple discharge bilaterally.   Gastrointestinal:   Abdominal Examination:  Abdomen nontender to palpation, tone normal without rigidity or guarding, no masses present, umbilicus without lesions   Liver and Spleen:  No hepatomegaly present, liver nontender to palpation    Hernias:  No hernias present  Lymphatic: Lymph Nodes:  No other lymphadenopathy present  Skin:  General Inspection:  No rashes present, no lesions present, no  areas of  discoloration  Neurologic:    Mental Status:  Oriented X3.  Normal strength and tone, sensory exam grossly normal, mentation intact and speech normal.    Psychiatric:   Mentation appears normal and affect normal/bright.         Pelvic Exam:  External Genitalia:     Normal appearance for age, no discharge present, no tenderness present, no inflammatory lesions present, color normal  Vagina:     Normal vaginal vault without central or paravaginal defects, yellow creamy discharge present, no inflammatory lesions present, no masses present  Bladder:     Nontender to palpation  Urethra:   Urethral Body:  Urethra palpation normal, urethra structural support normal   Urethral Meatus:  No erythema or lesions present  Cervix:     Appearance healthy, no lesions present, nontender to palpation, no bleeding present  Uterus:     Uterus: firm, normal sized and nontender, anteverted in position.   Adnexa:     No adnexal tenderness present, no adnexal masses present  Perineum:     Perineum within normal limits, no evidence of trauma, no rashes or skin lesions present  Anus:     Anus within normal limits, no hemorrhoids present  Inguinal Lymph Nodes:     No lymphadenopathy present  Pubic Hair:     Normal pubic hair distribution for age  Genitalia and Groin:     No rashes present, no lesions present, no areas of discoloration, no masses present      COUNSELING:   Reviewed preventive health counseling, as reflected in patient instructions  Special attention given to:        Regular exercise       Healthy diet/nutrition       Family planning    BMI: There is no height or weight on file to calculate BMI.      ASSESSMENT:  36 year old female with satisfactory annual exam.    ICD-10-CM    1. Encounter for gynecological examination without abnormal finding  Z01.419    2. Encounter for lipid screening for cardiovascular disease  Z13.220 Lipid panel reflex to direct LDL Fasting    Z13.6    3. Screening for metabolic disorder   Z13.228 Comprehensive metabolic panel   4. Vaginal discharge  N89.8 Wet prep   5. H/O:   Z98.891    6. H/O abnormal cervical Papanicolaou smear  Z87.42        PLAN:  Reviewed recommendations for pap smear screening.  She will be due in .  Vaginal discharge:  Wet prep was negative.  Discussed possible causes of discharge change.  Recommended trying rePhresh over the counter.  Return in a year for annual exam      Cheryl Posadas MD

## 2021-03-29 ENCOUNTER — OFFICE VISIT (OUTPATIENT)
Dept: OBGYN | Facility: CLINIC | Age: 36
End: 2021-03-29
Payer: COMMERCIAL

## 2021-03-29 VITALS — DIASTOLIC BLOOD PRESSURE: 60 MMHG | SYSTOLIC BLOOD PRESSURE: 106 MMHG

## 2021-03-29 DIAGNOSIS — N89.8 VAGINAL DISCHARGE: ICD-10-CM

## 2021-03-29 DIAGNOSIS — Z13.228 SCREENING FOR METABOLIC DISORDER: ICD-10-CM

## 2021-03-29 DIAGNOSIS — Z13.6 ENCOUNTER FOR LIPID SCREENING FOR CARDIOVASCULAR DISEASE: ICD-10-CM

## 2021-03-29 DIAGNOSIS — Z13.220 ENCOUNTER FOR LIPID SCREENING FOR CARDIOVASCULAR DISEASE: ICD-10-CM

## 2021-03-29 DIAGNOSIS — Z01.419 ENCOUNTER FOR GYNECOLOGICAL EXAMINATION WITHOUT ABNORMAL FINDING: Primary | ICD-10-CM

## 2021-03-29 DIAGNOSIS — Z98.891 H/O: C-SECTION: ICD-10-CM

## 2021-03-29 DIAGNOSIS — Z87.42 H/O ABNORMAL CERVICAL PAPANICOLAOU SMEAR: ICD-10-CM

## 2021-03-29 LAB
ALBUMIN SERPL-MCNC: 4.3 G/DL (ref 3.4–5)
ALP SERPL-CCNC: 45 U/L (ref 40–150)
ALT SERPL W P-5'-P-CCNC: 22 U/L (ref 0–50)
ANION GAP SERPL CALCULATED.3IONS-SCNC: 5 MMOL/L (ref 3–14)
AST SERPL W P-5'-P-CCNC: 17 U/L (ref 0–45)
BILIRUB SERPL-MCNC: 0.9 MG/DL (ref 0.2–1.3)
BUN SERPL-MCNC: 11 MG/DL (ref 7–30)
CALCIUM SERPL-MCNC: 9 MG/DL (ref 8.5–10.1)
CHLORIDE SERPL-SCNC: 108 MMOL/L (ref 94–109)
CHOLEST SERPL-MCNC: 174 MG/DL
CO2 SERPL-SCNC: 27 MMOL/L (ref 20–32)
CREAT SERPL-MCNC: 0.85 MG/DL (ref 0.52–1.04)
GFR SERPL CREATININE-BSD FRML MDRD: 88 ML/MIN/{1.73_M2}
GLUCOSE SERPL-MCNC: 83 MG/DL (ref 70–99)
HDLC SERPL-MCNC: 79 MG/DL
LDLC SERPL CALC-MCNC: 85 MG/DL
NONHDLC SERPL-MCNC: 95 MG/DL
POTASSIUM SERPL-SCNC: 4.2 MMOL/L (ref 3.4–5.3)
PROT SERPL-MCNC: 7.3 G/DL (ref 6.8–8.8)
SODIUM SERPL-SCNC: 140 MMOL/L (ref 133–144)
SPECIMEN SOURCE: NORMAL
TRIGL SERPL-MCNC: 52 MG/DL
WET PREP SPEC: NORMAL

## 2021-03-29 PROCEDURE — 99213 OFFICE O/P EST LOW 20 MIN: CPT | Mod: 25 | Performed by: OBSTETRICS & GYNECOLOGY

## 2021-03-29 PROCEDURE — 99385 PREV VISIT NEW AGE 18-39: CPT | Performed by: OBSTETRICS & GYNECOLOGY

## 2021-03-29 PROCEDURE — 36415 COLL VENOUS BLD VENIPUNCTURE: CPT | Performed by: OBSTETRICS & GYNECOLOGY

## 2021-03-29 PROCEDURE — 80061 LIPID PANEL: CPT | Performed by: OBSTETRICS & GYNECOLOGY

## 2021-03-29 PROCEDURE — 80053 COMPREHEN METABOLIC PANEL: CPT | Performed by: OBSTETRICS & GYNECOLOGY

## 2021-03-29 PROCEDURE — 87210 SMEAR WET MOUNT SALINE/INK: CPT | Performed by: OBSTETRICS & GYNECOLOGY

## 2021-03-29 RX ORDER — OMEGA-3 FATTY ACIDS/FISH OIL 300-1000MG
CAPSULE ORAL
COMMUNITY
End: 2022-10-25

## 2021-03-29 RX ORDER — MULTIVITAMIN,THERAPEUTIC
1 TABLET ORAL
COMMUNITY
End: 2022-10-24

## 2021-03-29 RX ORDER — LORATADINE 10 MG/1
10 TABLET ORAL PRN
COMMUNITY

## 2021-03-29 RX ORDER — FLUTICASONE PROPIONATE 50 MCG
1 SPRAY, SUSPENSION (ML) NASAL DAILY
COMMUNITY
End: 2022-10-24

## 2021-03-29 ASSESSMENT — ANXIETY QUESTIONNAIRES
1. FEELING NERVOUS, ANXIOUS, OR ON EDGE: NOT AT ALL
5. BEING SO RESTLESS THAT IT IS HARD TO SIT STILL: NOT AT ALL
7. FEELING AFRAID AS IF SOMETHING AWFUL MIGHT HAPPEN: NOT AT ALL
6. BECOMING EASILY ANNOYED OR IRRITABLE: NOT AT ALL
IF YOU CHECKED OFF ANY PROBLEMS ON THIS QUESTIONNAIRE, HOW DIFFICULT HAVE THESE PROBLEMS MADE IT FOR YOU TO DO YOUR WORK, TAKE CARE OF THINGS AT HOME, OR GET ALONG WITH OTHER PEOPLE: NOT DIFFICULT AT ALL
2. NOT BEING ABLE TO STOP OR CONTROL WORRYING: NOT AT ALL
3. WORRYING TOO MUCH ABOUT DIFFERENT THINGS: NOT AT ALL
GAD7 TOTAL SCORE: 0

## 2021-03-29 ASSESSMENT — PATIENT HEALTH QUESTIONNAIRE - PHQ9
SUM OF ALL RESPONSES TO PHQ QUESTIONS 1-9: 2
5. POOR APPETITE OR OVEREATING: NOT AT ALL

## 2021-03-29 NOTE — Clinical Note
Please abstract the following data from this visit with this patient into the appropriate field in Epic:    Other Tests found in the patient's chart through Chart Review/Care Everywhere:    [facility] and [facility], N[facility], Neg-HPV

## 2021-03-29 NOTE — NURSING NOTE
Please abstract the following data from this visit with this patient into the appropriate field in Epic:    Other Tests found in the patient's chart through Chart Review/Care Everywhere:    [facility] and HPV done by this [facility], NIL, Neg-HPV

## 2021-03-30 ASSESSMENT — ANXIETY QUESTIONNAIRES: GAD7 TOTAL SCORE: 0

## 2021-05-28 NOTE — PATIENT INSTRUCTIONS - HE
"-------------------------------------------------------------------------------------------------------  Blocked Ducts & Mastitis by Noah River MD    Mastitis is due to an infection (almost always due to bacteria rather than other types of germs) that usually occurs in breastfeeding mothers. However it can occur in any woman, even if she is not breastfeeding and can even occur in  babies of either sex. Nobody knows exactly why some women get mastitis and others do not. Bacteria may enter the breast through a crack or sore in the nipple but women without sore nipples also get mastitis and most women with cracks or sores do not.     Mastitis is different from a blocked duct because a blocked duct is not thought to be an infection and thus does not need to be treated with antibiotics. With a blocked duct, a mother has a painful, swollen, firm mass in the breast. The skin overlying the blocked duct is often red, but less intensely red than the redness of mastitis. Unlike mastitis, a blocked duct is not usually associated with fever, though it can be. Mastitis is usually more painful than a blocked duct, but both can be quite painful. Thus seeing the difference between a \"mild\" mastitis and a \"severe\" blocked duct may not be easy. It is also possible that a blocked duct goes on to become mastitis, so things become even more complicated. However, without a lump in the breast, there is no mastitis or blocked duct for that matter. In [place], physicians recognize something they call lymphangite when the mother has a painful, hot redness of the skin of the breast, associated with fever, but there is no painful lump in the breast. Apparently, most do not believe this lymphangite requires treatment with antibiotics. I have seen a few cases that fit this description and yes, in fact, the problem goes away without the mother taking antibiotics. But then, often a full-blown mastitis also goes away without the mother taking " "antibiotics.     As with almost all breastfeeding problems, a poor latch, and thus, poor emptying of the breast sets the mother up for blocked ducts and mastitis.     Blocked ducts     Blocked ducts will almost always resolve without special treatment within 24 to 48 hours after starting. During the time the block is present, the baby may be fussy when breastfeeding on that side because the milk flow will be slower than usual. This is probably due to pressure from the lump collapsing other ducts. A blocked duct can be made to resolve more quickly if you: Continue breastfeeding on that side and draining the breast better. This can be done by: Getting the best latch possible (see the information sheet When Latching as well as the video clips on how to latch a baby on at the website [employer]). Using compression to keep the milk flowing (see the information sheet Breast Compression as the video clips on how to latch a baby on at the website [employer]). Get your hand around the blocked duct and compress it as the baby is breastfeeding if it is not too painful to do so. Feed the baby in such a position that the baby's chin \"points\" to the blocked duct. Thus, if the blocked duct is in the bottom outside area of the breast (7 o'clock), then feeding the baby in the football position may be helpful. Apply heat to the affected area. You can do this with a heating pad or hot water bottle, but be careful not to burn your skin by using too much heat for too long a period of time. Try to rest. Of course, with a new baby it is not always easy to rest. Try going to bed. Take your baby with you into bed and breastfeed him there.     Ultrasound for blocked ducts     Most blocked ducts will be gone within about 48 hours. If your blocked duct has not gone by 48 hours or so, therapeutic ultrasound often works. Some mothers have used the flat end of an electric toothbrush to give themselves \"ultrasound\" treatment. And apparently have had " good results. If two treatments on two consecutive days have not helped resolve the blocked duct, there is no point in getting more treatments. Your blocked duct should be re-evaluated by your doctor or at our clinic. Usually, however, one treatment is all that is necessary. Ultrasound may also prevent recurrent blocked ducts that occur always in the same part of the breast.     Locally within the [facility] will do ultrasound treatment for blocked ducts.  Ask your lactation consultant for a referral to their physical therapy department, and then you can call [phone] to make an appointment.    Lecithin is a food supplement that seems to help some mothers prevent blocked ducts. It may do this by decreasing the viscosity (stickiness) of the milk by increasing the percentage of polyunsaturated fatty acids in the milk. It is safe to take, relatively inexpensive, and seems to work in at least some mothers. The dose is 1200 mg four times a day to prevent an infection if you have recurring infections and 2400mg four times a day if you have a current infection.        A bleb or blister     Sometimes, but not always by any means, a blocked duct is associated with a bleb or blister on the end of the nipple. A flat patch of white on the nipple is not a bleb or blister. If there is no painful lump in the breast, it is confusing to call a bleb or blister on the nipple a blocked duct. A bleb or blister is, usually, painful and is one cause of nipple pain that comes on later than the first few days. Some mothers get blisters in the first few days due to a poor latch. Nobody knows why a mother would suddenly get a bleb or blister out of the blue several weeks after the baby is born.     A blister is often present without the mother having a blocked duct.       Mastitis     If you start getting symptoms of mastitis (painful lump in the breast, redness and pain of the breast, fever), try to get  some rest. Go to bed and take the baby with you so you can continue breastfeeding while remaining in bed. Rest is good to help fight off infection.     Continue breastfeeding on the affected side. It should go without saying that you should continue on the other breast as well. Of course, if you are in so much pain that you cannot put the baby to the affected breast, continue on the other side and as soon as your breast is less painful put the baby to the breast with the mastitis. Sometimes expressing your milk may be less painful, but not always, so if you can, continue breastfeeding on the affected side. Mothers and babies share all their germs.     Heat helps fight off infection. It also may help with draining of the breast. Use a hot water bottle or heating pad but be careful not to burn the skin.     Fever helps fight off infection. Adults usually feel terrible when they have a fever and you may want to bring down the fever for this reason. But you don't need to bring down the fever just because it's there. Fever does not cause the milk to go bad!     Potatoes (adapted from LINDA Diaz, [facility]). Within the first 24 hours of your symptoms beginning, you may find that applying slices of raw potato to the breast will reduce the pain, swelling, and redness of mastitis. Cut 6 to 8 washed raw potatoes lengthwise into thin slices. Place in a large bowl of water at room temperature and leave for 15 to 20 minutes. Apply the wet potato slices to the affected area of the breast and leave for 15 to 20 minutes. Remove and discard after 15 to 20 minutes and apply new slices from the bowl. Repeat this process two more times so that you have applied potato slices 3 times in an hour. Take a break for 20 or 30 minutes and then repeat the procedure.    Mastitis and Antibiotics     Generally, it is better to avoid antibiotics if possible since mastitis may improve all on its own and antibiotics may  result in your getting a Candida (yeast, thrush) infection of the nipples and/or breast. Our approach is as follows:     If you have had symptoms consistent with mastitis for less than 24 hours, we would give you a prescription for an antibiotic, but suggest you wait before starting to take the medication.     If, over the next 8 to 12 hours, your symptoms are worsening (more pain, more spreading of the redness or enlarging of the painful lump), start the antibiotics.     If over the next 24 hours, your symptoms are not worse but not better, start the antibiotics.     If over the next 24 hours, your symptoms are lessening, then they will almost always continue to lessen and disappear without your needing to take the antibiotics. In this case, the symptoms will continue to lessen and will have disappeared over the next 2 to 7 days. Fever is often gone by 24 hours, the pain within 24 to 72 hours and the breast lump disappears over the next 5 to 7 days. Occasionally the lump takes longer than 7 days to disappear completely, but as long as it's getting small, this is a good thing.   If you have had symptoms consistent with mastitis for more 24 hours and the symptoms have not improved, you should start the antibiotics straight away.     If you are going to take an antibiotic, you need to take the right one. Amoxicillin, plain penicillin and some other antibiotics used frequently for mastitis do not kill the bacterium that almost always causes mastitis (Staphylococcus aureus). Some antibiotics which kill Staphylococcus aureus include: cephalexin (our usual choice), cloxacillin, dicloxacillin, flucloxacillin, amoxicillin combined with clavulinic acid, clindamycin and ciprofloxacin. Antibiotics that can be used for community acquired methicillin-resistant Staphylococcus aureus (CA-MRSA): cotrimoxazole and tetracycline.     All these antibiotics can be used when mothers are breastfeeding and do not require her to interrupt  breastfeeding.     You should not interrupt breastfeeding if you are infected with MRSA! Indeed, breastfeeding decreases the risk of the baby getting infection.     Medication for pain/fever (ibuprofen, acetaminophen, and others) can be helpful to get you through this. The amount that gets into the milk, as with almost all medications, is tiny. Acetaminophen is probably less useful than those drugs (e.g. ibuprofen) that have an anti-inflammatory affect.     Breast Abscess     The treatment of choice now for breast abscess is no longer surgery. There can be better results with ultrasound to locate the abscess and a catheter inserted into the abscess to drain it. Mothers going through this procedure do not stop breastfeeding even on the affected side, and complete healing occurs often within a week. This procedure is done by an intervention radiologist, not a surgeon. Ask your doctor to check out this study: Dusty Falcon MD, Juliana Garces MD, Darron Richmond MD. Breast Abscess in Lactating Women: US-guided Treatment. Radiology 2004; 232:904-909     For small abscesses, aspiration with a needle and syringe plus antibiotics often is all that is necessary, though it may be necessary to repeat the aspiration more than once.     A lump that isn't going away.     If you have a lump that is not going away or not getting smaller over more than a couple of weeks, you should be seen by a breastfeeding-friendly physician or surgeon. You don't have to interrupt or stop breastfeeding to get a breast lump investigated (ultrasound, mammogram and even biopsy do not require you to stop breastfeeding even on the affected side). A breastfeeding friendly surgeon will not tell you that you have to stop breastfeeding before s/he can do tests to investigate a breast lump.    Blocked duct and mastitis, February 2009   Written and revised (under other names) by Noah River MD, FRCPC, [phone]   Revised by Noah River MD,  FRCPC and Cinthya Alejo, IBCLC, 2008, 2009
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
## 2021-05-30 VITALS — BODY MASS INDEX: 21.62 KG/M2 | WEIGHT: 122 LBS | HEIGHT: 63 IN

## 2021-05-31 VITALS — BODY MASS INDEX: 21.62 KG/M2 | WEIGHT: 122 LBS | HEIGHT: 63 IN

## 2021-05-31 VITALS — HEIGHT: 63 IN | WEIGHT: 124 LBS | BODY MASS INDEX: 21.97 KG/M2

## 2021-05-31 VITALS — HEIGHT: 63 IN | WEIGHT: 131 LBS | BODY MASS INDEX: 23.21 KG/M2

## 2021-05-31 VITALS — BODY MASS INDEX: 21.97 KG/M2 | HEIGHT: 63 IN | WEIGHT: 124 LBS

## 2021-05-31 VITALS — HEIGHT: 63 IN | BODY MASS INDEX: 22.24 KG/M2 | WEIGHT: 125.5 LBS
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
## 2021-06-01 VITALS — HEIGHT: 63 IN | BODY MASS INDEX: 26.05 KG/M2 | WEIGHT: 147 LBS

## 2021-06-01 VITALS — WEIGHT: 153 LBS | BODY MASS INDEX: 27.11 KG/M2 | HEIGHT: 63 IN

## 2021-06-01 VITALS — HEIGHT: 63 IN | WEIGHT: 156 LBS | BODY MASS INDEX: 27.64 KG/M2

## 2021-06-01 VITALS — BODY MASS INDEX: 23.74 KG/M2 | HEIGHT: 63 IN | WEIGHT: 134 LBS

## 2021-06-01 VITALS — BODY MASS INDEX: 24.8 KG/M2 | WEIGHT: 140 LBS | HEIGHT: 63 IN

## 2021-06-01 VITALS — BODY MASS INDEX: 27.64 KG/M2 | HEIGHT: 63 IN | WEIGHT: 156 LBS

## 2021-06-01 VITALS — WEIGHT: 148.5 LBS | HEIGHT: 63 IN | BODY MASS INDEX: 26.31 KG/M2

## 2021-06-01 VITALS — HEIGHT: 63 IN | BODY MASS INDEX: 25.52 KG/M2 | WEIGHT: 144 LBS

## 2021-06-01 VITALS — HEIGHT: 63 IN | BODY MASS INDEX: 26.77 KG/M2 | WEIGHT: 151.1 LBS

## 2021-06-01 VITALS — BODY MASS INDEX: 24.19 KG/M2 | WEIGHT: 136.5 LBS | HEIGHT: 63 IN

## 2021-06-01 VITALS — WEIGHT: 139 LBS | HEIGHT: 63 IN | BODY MASS INDEX: 24.63 KG/M2

## 2021-06-01 VITALS — HEIGHT: 63 IN | BODY MASS INDEX: 27.64 KG/M2 | WEIGHT: 156 LBS

## 2021-06-01 VITALS — WEIGHT: 155 LBS | HEIGHT: 63 IN | BODY MASS INDEX: 27.46 KG/M2

## 2021-06-01 VITALS — BODY MASS INDEX: 27.29 KG/M2 | WEIGHT: 154 LBS | HEIGHT: 63 IN

## 2021-06-01 VITALS — WEIGHT: 134 LBS | HEIGHT: 63 IN | BODY MASS INDEX: 23.74 KG/M2

## 2021-06-02 VITALS — BODY MASS INDEX: 22.23 KG/M2 | WEIGHT: 125.5 LBS
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
## 2021-06-03 VITALS
HEART RATE: 84 BPM | WEIGHT: 128 LBS | SYSTOLIC BLOOD PRESSURE: 98 MMHG | HEIGHT: 63 IN | DIASTOLIC BLOOD PRESSURE: 72 MMHG | TEMPERATURE: 98.3 F | BODY MASS INDEX: 22.68 KG/M2

## 2021-06-03 VITALS — HEIGHT: 63 IN | WEIGHT: 124 LBS | BODY MASS INDEX: 21.97 KG/M2

## 2021-06-05 VITALS
TEMPERATURE: 98 F | OXYGEN SATURATION: 98 % | BODY MASS INDEX: 21.62 KG/M2 | SYSTOLIC BLOOD PRESSURE: 110 MMHG | WEIGHT: 122 LBS | HEART RATE: 80 BPM | RESPIRATION RATE: 16 BRPM | HEIGHT: 63 IN | DIASTOLIC BLOOD PRESSURE: 66 MMHG
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
## 2021-06-10 NOTE — PROGRESS NOTES
Note regarding u/s results on 4/7/17, no pregnancy episode created yet:    Intrauterine gestational sac at 5 weeks, 6 days. No embryo detected which may be due to the early gestational age. Clinical and ultrasound follow-up recommended.    Result discussed with patient and she will return for a follow up u/s in 1 week.     CLAUDY Churchill,DOEM

## 2021-06-10 NOTE — PROGRESS NOTES
"S:Kasia presents to the clinic by herself today.  She reports she is feeling well physically and that emotionally she is still quite sad on certain days, though not every day.  [facility], has been supported by friends, and has been exercising.  She reports her  \" thinks I had the same experience as him\" and she does not feel very validated by him.  She reports her counselor has been very helpful. Discussed mood disorders and asked her to contact the [facility] service if she finds her moods stay the same or worsen over the next few months.  She tells me she would not take antidepressant medications but will call if her sx worsen.    O:Patient appears AO x 3. Not in apparent distress.  Patient is tearful when discussing her miscarriage.     /62 (Patient Site: Right Arm, Patient Position: Sitting, Cuff Size: Adult Regular)  Pulse 68  Resp 16  Ht 5' 3\" (1.6 m)  Wt 124 lb (56.2 kg)  LMP 2017  Breastfeeding? No  BMI 21.97 kg/m2    Recent Results (from the past 24 hour(s))   Beta-hCG, Quantitative   Result Value Ref Range    Beta-hCG, Quantitative 31 (H) 0 - 4 mlU/mL     A:  31 yo , here for f/u exam due to a recent miscarriage  Beta HCG result trendinn down  Situational depression    P: Beta hCG no longer indicated, value was 31 today, and has been trending own.   Recommended she have IC and resume trying to become pregnant whenever she feels she wants to.    "

## 2021-06-10 NOTE — PROGRESS NOTES
Phone call 5/6/17  Kasia is a G1 now about 10 weeks gestation who calls with low back pain. 3 weeks ago diagnosed with blighted ovum. Spotted x 1 week then bright red bleeding started yesterday. See 5/5/17 note. She did go to [facility] ED yesterday. Examined (no US) and physician felt miscarriage nearly complete. She had passed tissue. Last night wore only 1 overnight pad. Called today with low back pain rated # 5-6 then she passed a clot or tissue (couldn't tell as there was quite a lot of bleeding in toilet) and pain now better #2. Reviewed symptoms of infection, asked to check temp and call if >99.5. Feeling safe and well supported at home. Call for heavy bleeding or pain and plan to [facility] in 2 weeks for post miscarriage visit. Support offered. Efra type A Pos.  .CLAUDY Leavitt,DOEM

## 2021-06-13 NOTE — PROGRESS NOTES
"Assessment & Plan   1. OME (otitis media with effusion), left  Acute otitis only partially resolved with previous treatment with clarithromycin.  Counseled on options and will treat with course of cefdinir.  Discussed small risk of reaction given her personal history of penicillin allergy.  She previously experienced a rash with penicillin and will call with any concerns.   - cefdinir (OMNICEF) 300 MG capsule; Take 1 capsule (300 mg total) by mouth 2 (two) times a day for 10 days.  Dispense: 20 capsule; Refill: 0  Kasia Jackson CNP    Subjective   Chief Complaint:  Otitis Media (Left ear infection )    HPI:   Kasia Bhardwaj is a 35 y.o. female who presents for left ear pain.      She was seen in [facility] 11/6 for left ear pain.  Prescribed clarithromycin x 5 days. Initially better.  Some aching but no significant pain.  Ear still feels plugged.  Like she is under water.  Has noted some clear drainage on a daily basis. Hearing is decreased. No fever. Has felt fatigued.        Allergies:  is allergic to penicillins.    SH/FH:  Social History and Family History reviewed and updated.   Tobacco Status:  She  reports that she has never smoked. She has never used smokeless tobacco.    Review of Systems:  A complete head to toe ROS is negative unless otherwise noted in HPI    Objective     Vitals:    11/16/20 1134   BP: 110/66   Pulse: 80   Resp: 16   Temp: 98  F (36.7  C)   TempSrc: Oral   SpO2: 98%   Weight: 122 lb (55.3 kg)   Height: 5' 3\" (1.6 m)       Physical Exam:  GENERAL: Alert, well-appearing   EARS: Right TM pearly grey, no bulging, redness, retraction. Left TM with mild erythema, serous effusion, loss of landmarks.  No perforation.       "
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
## 2021-06-14 NOTE — PROGRESS NOTES
"Kasia is here for a routine PNV at 13w4d here with partner Fernando. Lots of approp questions. Disc supplements including vitamin D, DHA, prenatal vitamin, calcium.  CBE options discussed. Feeling \"great\" some vomiting but minimal nausea. Fatigue letting up although not sleeping well. Able to fall asleep but she wakes to go to the bathroom then difficult to fall back asleep. Declines  testing other than 20 wk US.  Excited to feel some flutters.  Happy to hear fetal heart tones today.  Had some upper respiratory symptoms/cold but is now improved.  No fever.  Referred to cold and pregnancy handout.  Patient understands when to call/come in.  "

## 2021-06-15 NOTE — PROGRESS NOTES
Kasia is here today with her .  She has several questions today regarding congestion in pregnancy with occasional bloody discharge with blowing her nose.  She has also been having some headaches, but not bad enough that she is taking any medication.  We discussed physiologic changes in pregnancy that can cause these symptoms.  Also discussed comfort measures to help relieve them.  Other questions today include a cloudy urine occasionally without any dysuria or abdominal pain or cramping, round ligament pain, creating a birth plan, taking a hospital to her, and prenatal classes.  All of these were answered for her.  They continue to decline genetic screening but are excited to have their ultrasound.  This was ordered today.  She is having active fetal movements daily.

## 2021-06-15 NOTE — DISCHARGE SUMMARY
Outpatient/Triage Note:    Patient Name:  Kasia Bhardwaj  :      1985  MRN:      [id number]      Subjective:  Kasia is doing well.  No pain, no further spotting noted, baby active.    Eating some food.  Well-supported by  Fernando.     Objective:  Vitals:    18 1405   Pulse: 85   Resp: 18   Temp: 98.4  F (36.9  C)       Abdomen: continues to be soft.   FHR: 140s in US  Other physical exam: no further bleeding noted    Results:  Ultrasound:  ULTRASOUND LIMITED OBSTETRICAL  2018 3:42 PM     INDICATION: Light vaginal bleeding, unknown origin.  History of a fall on the ice 4 days ago impacting right wrist/right hip.  TECHNIQUE: Transabdominal and endovaginal ultrasound.  COMPARISON: 2018     FINDINGS:  FETAL POSITION: Breech     PLACENTA LOCATION: Anterior and left wall  AMNIOTIC FLUID: Normal (IVANIA 11.6 cm)  FETAL HEART RATE: 149 bpm     No abnormalities.     IMPRESSION:   CONCLUSION:  1.  Single intrauterine pregnancy      2.  No abnormalities.      Labs:        Recent Results (from the past 24 hour(s))   Collect and send wet prep vaginal specimen as indicated by prenatal history and/or patient complaints    Collection Time: 18  2:33 PM   Result Value Ref Range    Yeast Result No yeast seen No yeast seen    Trichomonas No Trichomonas seen No Trichomonas seen    Clue Cells, Wet Prep Clue cells seen (!) No Clue cells seen   Kleihauer-Betke stain    Collection Time: 18  3:09 PM   Result Value Ref Range    Betke/Fetal Hgb Det 1.6 (H) <=0.1 %   HM2(CBC w/o Differential)    Collection Time: 18  3:09 PM   Result Value Ref Range    WBC 9.0 4.0 - 11.0 thou/uL    RBC 3.92 3.80 - 5.40 mill/uL    Hemoglobin 12.7 12.0 - 16.0 g/dL    Hematocrit 35.8 35.0 - 47.0 %    MCV 91 80 - 100 fL    MCH 32.4 27.0 - 34.0 pg    MCHC 35.5 32.0 - 36.0 g/dL    RDW 13.3 11.0 - 14.5 %    Platelets 240 140 - 440 thou/uL    MPV 9.0 8.5 - 12.5 fL         Assessment:  -  @ 21w2d here for evaluation of  brown vaginal bleeding, has a history of a fall on the ice 4 days ago (not bleeding at the time and stable when evaluated 1 day later), however had impact to R side of body although no direct impact to abdomen.   - +BV (patient did notice some mild odor earlier in the week)  - US WNL  - Anurag positive at a value of = 1.6      Plan:   1)  Consulted Dr Moore and the following plan of care made:    Home with plans for pelvic rest and no exercise for several weeks (between 2 and 4)    Plan CNM clinic visit within the next week    US for growth, fetal evaluation, and placental evaluation in 1 month.    Another US in 3rd trimester to evaluate fetal growth again.    Education that the spotting may continue for a few days up to a few weeks, yet the trend should always be downward.  If seems to be increasing or if would turn pink/red to return to shelter.    Maternal fetal movement evaluation daily    Consider more frequent visits if needed.  2)  The couple ask thoughtful and appropriate questions about circumstance.  Kasia is tearful through all of this.  Very worried about the baby, having some trouble understanding how this could have happened as her fall seems really insignificant.  Encouragement given/support offered.  All questions answered.  Will call PRN.  3) Education about all of above plan, as well as general education about decreased or abnormal fetal movement patterns, leaking of fluid, vaginal bleeding, signs of labor, and other general warning signs.   4)  Reviewed how to contact on-call CNM. Has next visit scheduled for mid-week.   5)  All questions answered. Agrees with plan.   6) RX for flagyl sent to pharmacy      TT = 30 minutes of time of which >50% on education/counseling/care-coordination           Provider:  CLAUDY Shook/L[place]

## 2021-06-15 NOTE — PROGRESS NOTES
EDC 2018 here with hx of bleeding x 1 about 1 tbsp after having a hard constipated BM about 1 hour ago. Had low gas type abd pain yesterday. Had a fall last week that she fell catching self right side of body. Has had good fetal movement since fall and today. RH+

## 2021-06-15 NOTE — PROGRESS NOTES
Kasia presents to the clinic today with her partner Fernando.  (Please see progress note from [facility] long term February 4, 2018.)  Brownish vaginal discharge has completely abated, and she denies abdominal pain, vaginal bleeding or loss of fluid.  Undergoing treatment for bacterial vaginosis and tolerating well.  Discussed the plan of care determined by CLAUDY Mckeon CNM and in-house obstetrician, Dr. Moore: Ultrasound ordered and to be completed in about 3-4 weeks for growth and evaluation of placenta before return to clinic for prenatal visit.  Second trimester teaching completed.  Question regarding physical exercise: This writer supports yoga, gentle walking or pool wading.  Total weight gain within normal limits.  Danger signs and symptoms reviewed.  All questions answered.  Encouraged to call or return to clinic with any questions, concerns, or as needed.

## 2021-06-15 NOTE — PROGRESS NOTES
Discharged home with verbal and written instructions. Has an appt on Wed with CNM and will keep that appt. To call prn

## 2021-06-15 NOTE — PROGRESS NOTES
A: Fall on ice during pregnancy  GF1P0 at 20 6/7 weeks  Rh +    P: Call for abdominal pain, vaginal bleeding, decreased fetal movement  RTC for regularly scheduled visit in 1.5 weeks  Do not feel US is necessary, nor K-B blood test (for predicting PTL, not Rh sensitization)    S: Kasia reports fall on the ice last night.  She fell on her right wrist and hip.  She did not call the CNMs last night after it happened but did call this morning and it was recommended she come for evaluation.  She denies any bleeding, LOF, or ctx.  She denies abdominal pain.  She does report that often she wakes up with soreness along the sides of her abdomen extending toward her groin, and this was true today as well.  That feeling is now gone.  She also reports she has been feeling good fetal movement, stating her baby moves frequently.  Together, we listened to her fetus with the doptone for a couple of minutes.  FM was audible and palpable.  Warning signs for abruption were discussed.  Encouraged to call in right away if she fell in the future.      O: Physical Examination: General appearance - alert, well appearing, and in no distress  Mental status - alert, oriented to person, place, and time  Abdomen - soft, nontender, nondistended, gravid

## 2021-06-15 NOTE — PROGRESS NOTES
Outpatient/Triage Note:    Patient Name:  Kasia Bhardwaj  :      1985  MRN:      [id number]      Subjective:  Kasia is a 33 y.o.  who presented to [facility] for evaluation of light vaginal bleeding.     See phone note for full history.  Quick details as follows:  Started to have light brown bleeding today following a work out.  Typical activity for her, not strenuous and not a suspected reason for bleeding.  No sex/other explainable reasons for bleeding.  Did however have a hard stool today - ?? If could have contributed to bleeding yet by her description seems unlikely.  Although volume is low, Kasia is very worried.    Now that has arrived here, hasn't seen any new bleeding discharge discharge.    History of recent fall.  Happened 4 days ago.  As earlier gestational age and also because Kasia didn't call until the day after the fall, had an in-clinic evaluation post fall.  FHR checked and physical exam completed at that time = WNL.  No US done then.  History is that fell on ice on a friend's house sidewalk.  Hit R wrist and write side.   No abdominal impact.    Objective:  Vitals:    18 1405   Pulse: 85   Resp: 18   Temp: 98.4  F (36.9  C)       Abdomen: SIUP, soft, no tenderness.  History of cramping last evening, but gone today and thinks was r/t needing to have a BM.  CNM place hands on maternal abdomen for 5+ minute and stays soft.  by Leopold's, abdomen non-tender  FHR: doppler FHR over several minutes 140 - 150 range.  Active fetal movement felt my mom.  Uterine contractions: Not feeling cramping or contractions and CNM not palpating them.  Not true contractions noted when put on monitor for 20+ minutes.  Sterile speculum exam: Speculum enter vagina easily.  No irritation to vaginal walls and not obvious reason for bleeding on cervix (not friable appearing).  Small collection of brownish blood in vault.  Volume small, yet collection still present and no vaginal reason for bleeding apparent.   Consistency thinner, but does not appear to be ROM and patient has also described small amount of brown c some mucous-like consistency.  Story not consistent with PROM status either.  Can continue to evaluate.  Cervical exam: Closed, non-laboring, ~3 cm long.       Results:  Wet prep obtained:  + BV  No UA obtained as does not have any UTI sx  Kleihauer-Betke and Hemogram to be drawn      Assessment:  -  @ 21w2d here for evaluation of brown vaginal bleeding.   - Significant history of fall on ice 4 days ago - no abdominal impact.  Evaluation the following day WNL.  No bleeding until today.  - Active baby      Plan:   - Await further lab draws  - Await US to evaluate placenta.  - Further plan of care to be established when other results back.      TT = 30 minutes of time of which >50% on education/counseling/care-coordination           Provider:  CLAUDY Shook/L[place]ON

## 2021-06-16 PROBLEM — R39.15 URINARY URGENCY: Status: ACTIVE | Noted: 2019-10-31

## 2021-06-16 PROBLEM — N86 ECTROPION OF CERVIX: Status: ACTIVE | Noted: 2018-03-29

## 2021-06-16 PROBLEM — Z12.4 CERVICAL CANCER SCREENING: Status: ACTIVE | Noted: 2019-09-06

## 2021-06-16 NOTE — PROGRESS NOTES
Kasia is here alone for routine prenatal visit at 25 weeks.  She has been seen recently for problem visits related to bleeding but today is her regularly scheduled prenatal visit.  She was seen Friday and had some bleeding during the exam and was sent to [facility] for further evaluation.  She states she had an ultrasound done and a repeated vaginal exam and everything was stable and she was discharged after 3 hours. The [facility] thought the bleeding was possibly related to some constipation she was having exacerbated by the vaginal exam/cervical swab done in clinic on Friday.  Has had no further bleeding.    She had a follow-up ultrasound done yesterday to reevaluate fetal growth after she fell earlier in the pregnancy.  Fetal growth was normal and no abnormalities were seen.  She has questions today regarding sleeping positions which were answered.  Is experiencing slight increase in vaginal discharge without irritation or odor.  Normalcy discussed.  Feeling occasional Bennie Emerson contractions but denies  labor symptoms.  Questions answered about water birth eligibility and consent given for patient review.  Discussed that we will sign at a future visit.  Attended Meet the midwives and toured [facility].  Is planning to give birth at [facility] but would feel comfortable at [facility] in the case of a divert.    Kasia's  is a type I diabetic and the recent complications with her pregnancy have been very stressful for him in relation to managing his condition.  They are considering hiring a  to support both of them in the process and this writer encouraged this desire.  Resources for finding a  discussed.    Discussed 28 week lab testing at next visit and Tdap vaccine.

## 2021-06-16 NOTE — PROGRESS NOTES
Kasia presents to clinic after speaking with the on-call CNM regarding bleeding.  She states that at 10 am this morning she noted light pink discharge on tissue paper when she wiped after going to the bathroom.  This occurred again approximately 1 hour later.  Patient denies cramping and has been feeling fetal movement.  She denies sexual intercourse in the last 2 weeks.  She also reports pressure in her pelvis at the end of her urine stream.  She states she thinks she may have a urinary tract infection.  A UA was performed and was negative.  UC has been sent and is in process.  I recommended a speculum exam, wet prep, and ultrasound.  Patient states she has an ultrasound scheduled on 3/5/2018 and she is okay to wait for this.  Upon placing the speculum her cervix appeared normal without obvious bleeding in the vaginal vault.  Upon taking a large Q-tip and gently wiping the cervix, this amount of pressure brought forward bright red blood immediately.  A small blood clot was also noted.  I was able to soak up the blood in full using 2 more large Q-tips and two 4 x 4 gauze squares.  Estimated blood loss 5 mls or less.  I described what I was seeing the patient and she began to cry.  Offered support.  NST performed.  NST shows fetal heart rate baseline of 140 with moderate variability and 10 x 10 accelerations which is appropriate for 25 weeks 0 days gestation.  Reactive NST.  Patient's  Fernando arrived to the clinic to support Kasia.  When patient stood up from the exam table she did not note a release of vaginal bleeding.  She did take down her pants down to show me her pad and it was clean.  I called CLAUDY Bundy CNM as she is the midwife on-call at [facility].  She discussed the case with Bruna Page MD who recommended patient go to [facility].  CLAUDY Bundy CNM paged to maternal-fetal medicine at [facility] to give the report.  At the time of this note she had not been called  back.  She did call the labor and delivery desk at [facility] and the nurses are aware that the patient is coming for care.  I recommended the patient go to [facility] immediately for care.  She states she will leave her car in the parking lot and allow her  to drive her to the hospital.

## 2021-06-17 NOTE — PROGRESS NOTES
Subjective   at 34w0d presents for routine prenatal care with Fernando.  Pt reports feeling well.  She reports positive fetal movement and denies vaginal bleeding.  Pt has questions about seasonal allergies and pregnancy.  Pt wonders if she is going to bleed more during early/active labor given that she has bled after IC this pregnancy.   Pt expresses desire to have birth attended by a CNM she has already met.    Objective  Fetal heart tones 145bpm  Fundal height 34cm  Vertex presentation could not be verified by Leopolds- fetus feels LOT  Reviewed ultrasound 3/5/18 report with pt (placenta at fundus, 4.7cm cervix, and baby in 58%).  Pt declines further ultrasounds for growth as follow up to her fall on ice   EDPS: 4    Assessment  Third trimester of pregnancy  Seasonal allergies in pregnancy    Plan  Treatment:  Pt received her breast pump today    Education:  Pt would like a CNM who she has seen prenatally to attend her birth.  Pt was educated on CNM call schedule and that there are two CNMs on call at all times. Encouraged Kasia that the midwife team all practices similarly and that she will likely feel connected to whomever she is paired with. That being said, offered for her to ask to see who is on-call at [place] and, if space allows, can choose to birth there.  Discussed plans for postpartum contraception.  Pt used condoms prior to this pregnancy.  She is apprehensive about using hormonal birth control  Began discussing IUDs (Mirena vs. Paraguard).  Will continue conversation.  Anticipatory guidance given regarding next prenatal visit: GBS swab, confirming vertex presentation  Discussed safe medications for seasonal allergies. Pt is currently using a nasal saline spray and neti-pot.  Pt was apprehensive about using Claritin.  Reassured pt that Claritin is safe during pregnancy.    Follow up:   Return to clinic in two weeks    FRANCES Fajardo  Patient was seen with student who was present for  learning.  I personally assessed, examined and made clinical decisions reflected in the documentation.  Lizy Duncan, CLAUDY,CNM

## 2021-06-17 NOTE — PROGRESS NOTES
Kasia Bhardwaj is here with Fernando for a routine prenatal visit at 31w5d.  She has no concerns and is feeling well.  Denies any PTL symptoms.    She is feeling fetal movement regularly. Fetal maturity and expectations for fetal movement in the third trimester.  Appetite is normal. Interval weight gain is appropriate.  Reviewed 28 week lab results.  TdaP given at last visit   Discussed how to pre-register on our website after 30 weeks. Discussed birth control options after delivery, patient plans condoms.  Discussed choosing a provider for infant care, patient plans [facility].  Anticipatory guidance, warning signs (with emphasis on  labor signs and symptoms), when to call and CNM contact information reviewed.     Discussed recommendation for 3rd trimester ultrasound for growth.  Patient states she was told that if her 2nd trimester ultrasound was normal that this ultrasound is option and patient declines another ultrasound at this time.

## 2021-06-17 NOTE — PROGRESS NOTES
Kasia is here alone today.  She reports that she is feeling well.  She has questions today regarding whether she can have a postpartum home visits, breech positioning, leg and foot cramps, and drinking red raspberry leaf tea.  We did discuss all of these things and comfort measures for cramps.  She will be starting a four-week series at [facility] next week.  She has not had any more bleeding, and is happy that she has not had to go in for evaluation at the hospital in the last 4 weeks!  She is noting patterns of fetal movement and is doing fetal movement counts.  She is having active fetal movement and no signs or symptoms of  labor.  Today we will do her glucose screening, hemoglobin, and she declines RPR.  They are also looking into  services.

## 2021-06-18 NOTE — ANESTHESIA POSTPROCEDURE EVALUATION
Patient: Kasia Bhardwaj   SECTION  Anesthesia type: epidural    Patient location: PACU  Last vitals:   Vitals:    18 0624   BP: 108/68   Pulse: 86   Resp: 14   Temp:    SpO2: 98%     Post vital signs: stable  Level of consciousness: awake and responds to simple questions  Post-anesthesia pain: pain controlled  Post-anesthesia nausea and vomiting: no  Pulmonary: unassisted, return to baseline  Cardiovascular: stable and blood pressure at baseline  Hydration: adequate  Anesthetic events: no    QCDR Measures:  ASA# 11 - Vita-op Cardiac Arrest: ASA11B - Patient did NOT experience unanticipated cardiac arrest  ASA# 12 - Vita-op Mortality Rate: ASA12B - Patient did NOT die  ASA# 13 - PACU Re-Intubation Rate: ASA13B - Patient did NOT require a new airway mgmt  ASA# 10 - Composite Anes Safety: ASA10A - No serious adverse event    Additional Notes:

## 2021-06-18 NOTE — PROGRESS NOTES
Kasia is here with her  Fernando today.  She is feeling well.  She notes that she feels that the baby has dropped over the last week.  Her main discussion today focused around postdates management.  We talked about the risks of stillbirth and  section the further from 41 weeks she would go.  We discussed that her options would be to have an induction of labor at 41 weeks or to do  testing including nonstress test and biophysical profile at that time and then be seen every 3 days after that.  She knows that she would not like to go more than 42 weeks in her pregnancy.  Her initial thought is that she would do  testing if she makes it 41 weeks and possibly accept membrane sweep at that visit.  She is having active fetal movements.  Information on postdates pregnancy was sent to her through my chart as well.

## 2021-06-18 NOTE — PROGRESS NOTES
Kasia is here with her  Fernando.  She has been alyx since about 2 in the morning.  From 3 to 5 AM her contractions were about 5 minutes apart, however now they are irregular, may be having one per 30 minutes.  She denies any leaking of fluid or bleeding.  She does note fatigue and is concerned about having irregular contractions or prodromal labor for several days.  We discussed induction of labor versus expectant management, and she would prefer an induction of labor if she does not have her baby by Monday.  I offered her Vistaril for some therapeutic rest, and she does not feel comfortable with this.  She is comfortable trying Benadryl, however.  She has a BPP appointment at 1030 this morning.  Her NST in clinic is reactive.  Again, we discussed intercourse as a way to help hasten labor and also sweeping of the membranes.  I did attempt to sweep today, however fetal station was high and it was difficult to do this.  Should score is 3.  We discussed rest as a top priority, when to call and come in in relation to labor, eating light meals, and keeping hydrated.  She will plan to come to [facility] for induction of labor at 730 Monday morning if she has not gone into labor prior to that.  She is feeling active fetal movement.    NST: 140 baseline, occasional contraction, accelerations present, no decelerations

## 2021-06-18 NOTE — PROGRESS NOTES
Kasia presents with Fernando today  Kasia complains of hip pain and occasional round ligament pain. Kasia is doing weekly chiropractic visits, Spinning babies stretches. Suggested she try prenatal massage and submersion in deep water to promote circulation. Also encouraged Kasia to listen to her body and rest and relax as needed to make sure her reserves are high these last few weeks,  She and Fernando took to childbirth education classes at [facility] -felt the pushed [place]oul[place] somewhat excessively -but overall felt they got good information and feel prepared for early labor at home and they birth.  Kasia has considered the option of a water birth and would like to decline at this time.  She would like to labor in the tub but would prefer to give birth on the bed.  Denies headaches, visual changes, right upper quadrant pain.  Reviewed labor precautions and when to call  Please ask about pediatrician choice next visit

## 2021-06-18 NOTE — ANESTHESIA CARE TRANSFER NOTE
Last vitals:   Vitals:    06/22/18 0326   BP: 110/53   Pulse: (!) 104   Resp: 16   Temp: 36.8  C (98.3  F)   SpO2: 98%     Patient's level of consciousness is awake  Spontaneous respirations: yes  Maintains airway independently: yes  Dentition unchanged: yes  Oropharynx: oropharynx clear of all foreign objects    QCDR Measures:  ASA# 20 - Surgical Safety Checklist: WHO surgical safety checklist completed prior to induction  PQRS# 430 - Adult PONV Prevention: 4558F - Pt received => 2 anti-emetic agents (different classes) preop & intraop  ASA# 8 - Peds PONV Prevention: NA - Not pediatric patient, not GA or 2 or more risk factors NOT present  PQRS# 424 - Vita-op Temp Management: 4559F - At least one body temp DOCUMENTED => 35.5C or 95.9F within required timeframe  PQRS# 426 - PACU Transfer Protocol: - Transfer of care checklist used  ASA# 14 - Acute Post-op Pain: ASA14B - Patient did NOT experience pain >= 7 out of 10

## 2021-06-18 NOTE — PROGRESS NOTES
Kasia presents to clinic with her partner today.  She is very happy to be at this stage regarding her history of miscarriage.  She states she is walking a lot.  She has been experiencing frequent BH contractions beginning on the day.  She reports her baby is moving a lot. Reviewed s/sx of preeclampsia and recommend she call the midwife on-call in case that she develops blurred or spots/floaters in her vision, abdominal/or a headache that is not Tylenol.  Also recommended she call the midwife on-call in the that she experiences watery fluid, red vaginal bleeding, or decreased/absent fetal movement.  She has questions regarding exercise following delivery.  Questions answered.  She is also concerned about the consistency of her stool.  She does have a bowel movement daily but it is dry/hard.  Recommended stool softener.  Recommended Colace specifically.  Recommended against senna, discussed stimulant laxative not recommended.  Discussed components of her birth plan and that she desires an unmedicated birth, no circumcision, and no erythromycin for the eyes.  She has a breast pump at home.  I encouraged her to bring in a written birth plan so that we may scan it into her chart and make it available for all providers and nurses involved in healthcare to see.  Please water birth consent next visit.  Ask if car seat has been installed.  Return to clinic in 1 week.

## 2021-06-18 NOTE — ANESTHESIA PROCEDURE NOTES
Epidural Block    Patient location during procedure: OB  Time Called: 6/21/2018 8:49 PM  Reason for Block:labor epidural  Staffing:  Performing  Anesthesiologist: CATALINA ANDRE IV  Preanesthetic Checklist  Completed: patient identified, risks, benefits, and alternatives discussed, timeout performed, consent obtained, at patient's request, airway assessed, oxygen available, suction available, emergency drugs available and hand hygiene performed  Procedure  Patient position: sitting  Prep: ChloraPrep  Patient monitoring: continuous pulse oximetry, heart rate and blood pressure  Approach: midline  Location: L4-L5  Injection technique: EVERTON saline  Number of Attempts:1  Needle  Needle type: Luci   Needle gauge: 18 G     Catheter in Space: 4  Assessment  Sensory level:  No complications

## 2021-06-18 NOTE — ANESTHESIA PREPROCEDURE EVALUATION
Anesthesia Evaluation      Patient summary reviewed   No history of anesthetic complications     Airway   Mallampati: II  Neck ROM: full   Pulmonary - negative ROS and normal exam                          Cardiovascular - negative ROS and normal exam  (-) hypertension   Neuro/Psych - negative ROS     Endo/Other - negative ROS      GI/Hepatic/Renal - negative ROS           Dental                         Anesthesia Plan  Planned anesthetic: epidural    ASA 3 - emergent     Anesthetic plan and risks discussed with: patient

## 2021-06-18 NOTE — PROGRESS NOTES
Kasia is here with her  Fernando today.  She is feeling well.  She has prepared a birth plan but she forgot its will bring it next week.  She has questions today regarding fetal movements during labor, frequency of vaginal exams, what happens when her water breaks, when you would break the water, and bleeding during labor after her sterile vaginal exam today.  Given her history of postcoital bleeding and spotting during pregnancy, I would suspect that she would have spotting after this exam and there was blood noted on my glove after the exam.  She was instructed to call if the bleeding was more than a period.  She is having active fetal movements and feels occasional Bennie Emerson contractions.  Unable to confirm vertex presentation by vaginal exam, so clinic ultrasound was done and did confirm vertex presentation.  Suspect ROP position at this time.  Discussed spinning [email or web address] techniques to help relax and soften the pelvic floor to optimize fetal movement.  Will do GBS and hemoglobin today as well.

## 2021-06-19 NOTE — PROGRESS NOTES
JIMENA@Patient ID: Kasia Bhardwaj is a 33 y.o. female.  Assessment:   Normal postpartum exam at ~6 weeks postpartum.   lactating    Plan:    1. Pap smear not done at today's visit. Due for annual Gyn exam in 2019 but recommend annual checkup   2. Desired contraception: condoms.   3. Discussed resumption of exercise and setting a goal to return to pre-pregnancy weight in the next 6-12 months.   4.  Resumption of intercourse reviewed with possible changes in libido and vaginal lubrication while nursing.  4.  Nutrition and supplements reviewed.  Advised continuation of a prenatal or multivitamin, also Vitamin D3 5000 IU geltab daily and an omega 3 fatty acid supplement.  5. Adjustment to parenting, self care and open communication with her support system discussed. Warning signs and symptoms related to postpartum mood disorders reviewed.     Total time spent with patient 40 minutes, >50% counseling, education and coordination of care.    Subjective:     Kasia Bhardwaj is a 33 y.o. female who presents for postpartum visit. She is 6 weeks postpartum following a Primary LTCS.  I have fully reviewed the prenatal and intrapartum course. The delivery was at Term and 41 weeks gestation Her baby boy is named Adiel and weighed 7 lbs 5.5 oz at birth.  She is slightly teary today as we discussed her labor.  She does feel, however, that she and her providers did everything they could to get her to have a vaginal birth.  She is ultimately okay with having had a  birth.  We discussed that it may take time for her to grieve the birth that she did not have and that she should honor those feelings.  States that Dr. Emery told her she had a heart shaped pelvis and would not recommend a  in the future.    Postpartum course has been stable. Baby's course has been stable. Baby is feeding breast with one bottle of expressed breast milk fed daily by Fernando.  Lochia ceased at 5 weeks postpartum.  Bowel function is  normal. Bladder function is normal. She has not resumed intercourse. Desired contraception: condoms. [place] postpartum depression screening score: 2. She has resumed regular exercise. Patient returns to work in 6 weeks.  When she goes back to work, Fernando will stay at home with Adiel for 7 more weeks.      Review of Systems  General:  Denies problem  Eyes: Denies problem  Ears/Nose/Throat: Denies problem  Cardiovascular: Denies problem  Respiratory:  Denies problem  Gastrointestinal:  Denies problem, Genitourinary: Denies problem  Musculoskeletal:  Denies problem  Skin: Denies problem  Neurologic: Denies problem  Psychiatric: Denies Problem  Endocrine: Denies problem    Objective:         Physical Exam:  General Appearance: Alert, cooperative, no distress, appears stated age  Skin: Skin color, texture, turgor normal, no rashes or lesions  HEENT: grossly normal; otoscopic and opthalmic exam not performed.   Neck: Supple, symmetrical, trachea midline, no adenopathy;  thyroid: not enlarged, symmetric, no tenderness/mass/nodules  Lungs: Clear to auscultation bilaterally, respirations unlabored  Breasts: No breast masses, tenderness, asymmetry, or nipple discharge or patient report.  No exam today.  Heart: Regular rate and rhythm, S1 and S2 normal, no murmur, rub, or gallop  Abdomen: Soft, non-tender.  Incision well healed  Pelvic: Declined today  Extremities: Extremities normal without varicosities or edema of visible skin       Last Pap: 9/2014. Results were: normal, HPV negative  Immunization History   Administered Date(s) Administered     HPV Quadrivalent 11/01/2007, 01/01/2008, 08/01/2008     Hep B, Adult 04/08/2005, 05/08/2005, 12/08/2005     Hepatitis A, Ped/Adol 2 Dose IM (18yr & under) 01/01/2003, 01/01/2004     Influenza, Seasonal, Inj PF IIV3 09/06/2011, 09/12/2012     Influenza, inj, historic,unspecified 09/23/2013, 09/09/2014     Influenza, seasonal,quad inj 36+ mos 10/06/2016     Influenza,seasonal quad,  PF, 36+MOS 12/12/2017     Tdap 09/12/2012, 03/29/2018     Typhoid Live, Oral 09/12/2012     Immunization status: up to date and documented

## 2021-06-22 NOTE — PROGRESS NOTES
Assessment & Plan   1. Left breast mass  I think this is likely a galactocele though would like to rule out other abnormality. Ultrasound ordered and will follow up with results.   - US Breast Limited (Focal) Left; Future    2. Carpal tunnel syndrome of left wrist  Symptoms consistent with carpal tunnel, discussed this is more prevalent in pregnancy and postpartum.  Will start with night splinting and if no improvement consider referral for PT  - Wrist/Arm DME: Wrist Brace; Left; non-thumb spica    3. Patellofemoral pain syndrome of left knee  History consistent with patellofemoral pain syndrome, normal exam.  Recommended period of rest from exercise for two weeks and then slow return.  If no improvement, PT    4. Lightheadedness  Likely related to stress and insufficient caloric intake.  Her blood pressure is on the low end today as well.  This has improved so if labs are normal today, simply continue to monitor.   - HM2(CBC w/o Differential)  - Ferritin  - Thyroid Stimulating Hormone (TSH)  - Basic Metabolic Panel    Kasia Jackson CNP    Subjective   Chief Complaint:  Dizziness (x 2-3 weeks, standing up, moving too quickly - has gotten a little better since making the appointment); Breast Mass (L breast x 5 months, is currenlty breastfeeding, changes in size depending on how full of milk breasts are, not painful); Knee Pain (L knee x 2-3 months, when she started working out is when the pain started); and Carpal Tunnel (L hand/wrist)    HPI:   Kasia Bhardwaj is a 33 y.o. female who presents for several concerns.   She has previously followed with the midwives.  PMH negative for chronic concern.      Left wrist pain: Pain on radial and dorsal aspect of left wrist began approximately 2 months ago.  Primarily uncomfortable when she awakens in the morning.  Pain radiates up into the thumb and first finger.  Denies numbness tingling.  No weakness on this side.  Has not yet tried anything for this.  Does not necessarily  engage in any repetitive movements throughout the day.  Does do some typing and computer work for her role with [employer].    Left knee pain: Also began 2-3 months ago.  Described as located in the front of the knee, generalized.  It began after she resumed working out in the postpartum period.  Worst with full flexion, doing squats.  No instability.    Dizziness: Intermittent for the past 2-3 weeks.  She states this has improved significantly.  She went through a time of stress with her mother being in the hospital and her son being ill.  She states she has quickly lost her pregnancy weight and is now back to her prepregnancy weight.  She is constantly hungry.  She has difficulty keeping up with her caloric needs with breast-feeding.  She denies any syncope or presyncope.  No palpitations.    Breast mass: Left side, present for several months.  Enlarges when breasts are full and decreases in size when emptied. Never completely resolves. Not painful. No FMH of breast cancer      Allergies:  is allergic to penicillins.    SH/FH:  Social History and Family History reviewed and updated.   Tobacco Status:  She  reports that  has never smoked. she has never used smokeless tobacco.    Review of Systems:  A complete head to toe ROS is negative unless otherwise noted in HPI    Objective     Vitals:    11/29/18 0927   BP: 96/68   Patient Site: Left Arm   Patient Position: Sitting   Cuff Size: Adult Regular   Pulse: 82   SpO2: 98%   Weight: 125 lb 8 oz (56.9 kg)       Physical Exam:  GENERAL: Alert, well-appearing female  PSYCH: Pleasant mood, affect appropriate.    CV: Regular rate and rhythm without murmurs, rubs or gallops.  RESP: Lung sounds clear  BREASTS: Breasts symmetric, no dimpling, masses or skin discolorations seen. Areolas and nipples symmetric without discharge. On palpation, right breast tissue dense, breast slightly engorged.  Left breast tissue supple, area of defined density at 12-1 o'clock. Non-tender to  palpation. Axillary and epitrochlear lymph nodes nonpalpable.   MSK:   KNEES:  Knees in alignment.  No TTP.  Full ROM.  Negative Lachman, posterior drawer, Marcellus.   L wrist:  No edema. No TTP.  Full ROM.  Pain with resisted flexion and extension.  Positive carpal compression test. Negative Tinel, Phalen.

## 2021-07-03 NOTE — ADDENDUM NOTE
Addendum Note by Brittni Solano APRN, CNM at 4/12/2017  3:41 PM     Author: Brittni Solano APRN, CNM Service: -- Author Type: Midwife    Filed: 4/12/2017  3:41 PM Encounter Date: 4/11/2017 Status: Signed    : Brittni Solano APRN, CNM (Midwife)    Addended by: BRITTNI SOLANO on: 4/12/2017 03:41 PM        Modules accepted: Orders

## 2021-07-14 PROBLEM — O03.9 MISCARRIAGE: Status: RESOLVED | Noted: 2017-04-17 | Resolved: 2017-12-12

## 2021-07-14 PROBLEM — R30.0 DYSURIA DURING PREGNANCY IN SECOND TRIMESTER: Status: RESOLVED | Noted: 2018-03-02 | Resolved: 2018-06-22

## 2021-07-14 PROBLEM — N93.0 PCB (POST COITAL BLEEDING): Status: RESOLVED | Noted: 2017-04-11 | Resolved: 2018-06-22

## 2021-07-14 PROBLEM — O20.9 VAGINAL BLEEDING BEFORE 22 WEEKS GESTATION: Status: RESOLVED | Noted: 2018-02-04 | Resolved: 2018-02-07

## 2021-07-14 PROBLEM — Z32.01 PREGNANCY EXAMINATION OR TEST, POSITIVE RESULT: Status: RESOLVED | Noted: 2017-10-19 | Resolved: 2017-11-09

## 2021-07-14 PROBLEM — Z3A.01 LESS THAN 8 WEEKS GESTATION OF PREGNANCY: Status: RESOLVED | Noted: 2017-03-30 | Resolved: 2017-11-09

## 2021-07-14 PROBLEM — O32.0XX0 UNSTABLE FETAL LIE: Status: RESOLVED | Noted: 2018-05-04 | Resolved: 2018-06-22

## 2021-07-14 PROBLEM — O36.8390 FETAL HEART RATE DECELERATIONS AFFECTING MANAGEMENT OF MOTHER: Status: RESOLVED | Noted: 2018-06-22 | Resolved: 2018-06-24

## 2021-07-14 PROBLEM — Z37.9 NORMAL LABOR: Status: RESOLVED | Noted: 2018-06-21 | Resolved: 2018-06-22

## 2021-07-14 PROBLEM — Z34.01 ENCOUNTER FOR SUPERVISION OF NORMAL FIRST PREGNANCY IN FIRST TRIMESTER: Status: RESOLVED | Noted: 2017-11-09 | Resolved: 2018-06-23

## 2021-07-14 PROBLEM — Z34.90 PREGNANT: Status: RESOLVED | Noted: 2018-06-21 | Resolved: 2018-06-22

## 2021-07-14 PROBLEM — O26.892 DYSURIA DURING PREGNANCY IN SECOND TRIMESTER: Status: RESOLVED | Noted: 2018-03-02 | Resolved: 2018-06-22

## 2021-09-26 ENCOUNTER — HEALTH MAINTENANCE LETTER (OUTPATIENT)
Age: 36
End: 2021-09-26

## 2022-05-07 ENCOUNTER — HEALTH MAINTENANCE LETTER (OUTPATIENT)
Age: 37
End: 2022-05-07

## 2022-10-21 NOTE — PROGRESS NOTES
" SUBJECTIVE:   CC: Kasia Bhardwaj is an 37 year old woman who presents for preventive health visit.     Patient has been advised of split billing requirements and indicates understanding: Yes  Healthy Habits:    Do you get at least three servings of calcium containing foods daily (dairy, green leafy vegetables, etc.)? { :596201::\"yes\"}    Amount of exercise or daily activities, outside of work: { :123607}    Problems taking medications regularly { :403644::\"No\"}    Medication side effects: { :852875::\"No\"}    Have you had an eye exam in the past two years? { :685504}    Do you see a dentist twice per year? { :177056}    Do you have sleep apnea, excessive snoring or daytime drowsiness?{ :855032}      Today's PHQ-2 Score:   PHQ-2 ( 1999 [employer]) 3/29/2021 3/23/2021   Q1: Little interest or pleasure in doing things 0 0   Q2: Feeling down, depressed or hopeless 0 0   PHQ-2 Score 0 0   PHQ-2 Total Score (12-17 Years)- Positive if 3 or more points; Administer PHQ-A if positive 0 0   Q1: Little interest or pleasure in doing things - Not at all   Q2: Feeling down, depressed or hopeless - Not at all   PHQ-2 Score - 0       Abuse: Current or Past(Physical, Sexual or Emotional)- {YES/NO/NA:868395}  Do you feel safe in your environment? {YES/NO/NA:202638}    Have you ever done Advance Care Planning? (For example, a Health Directive, POLST, or a discussion with a medical provider or your loved ones about your wishes): { :824724}    Social History     Tobacco Use     Smoking status: Never     Smokeless tobacco: Never   Substance Use Topics     Alcohol use: Not on file     If you drink alcohol do you typically have >3 drinks per day or >7 drinks per week? {ETOH :181143}                     Reviewed orders with patient.  Reviewed health maintenance and updated orders accordingly - {Yes/No:002829::\"Yes\"}  {Chronicprobdata (Optional):088437}    FHS-7: No flowsheet data found.  {If any of the questions to the BCRA (FHS-7) are answered " "yes, consider ordering referral for genetic counseling (Optional) :543202::\"click delete button to remove this line now\"}  {AMB Mammogram Decision Support (Optional) :011695}  Pertinent mammograms are reviewed under the imaging tab.    Pertinent mammograms are reviewed under the imaging tab.  History of abnormal Pap smear: {PAP HX:921120}  PAP / HPV Latest Ref Rng & Units 8/30/2019   PAP Negative for squamous intraepithelial lesion or malignancy. Negative for squamous intraepithelial lesion or malignancy  Electronically signed by Patricia Adams CT (ASCP) on 9/6/2019 at  2:11 PM     HPV16 NEG Negative   HPV18 NEG Negative   HRHPV NEG Negative     Reviewed and updated as needed this visit by clinical staff                  Reviewed and updated as needed this visit by Provider                 {HISTORY OPTIONS (Optional):077955}    ROS:  { :105759}    OBJECTIVE:   There were no vitals taken for this visit.  EXAM:  {Exam Choices:940469}    {Diagnostic Test Results (Optional):192148::\"Diagnostic Test Results:\",\"Labs reviewed in Epic\"}    ASSESSMENT/PLAN:   {Diag Picklist:302312}    Patient has been advised of split billing requirements and indicates understanding: {YES / NO:398471::\"Yes\"}  COUNSELING:   {FEMALE COUNSELING MESSAGES:363209::\"Reviewed preventive health counseling, as reflected in patient instructions\"}    Estimated body mass index is 21.61 kg/m  as calculated from the following:    Height as of 11/16/20: 1.6 m (5' 3\").    Weight as of 11/16/20: 55.3 kg (122 lb).    {Weight Management Plan (ACO) Complete if BMI is abnormal-  Ages 18-64  BMI >24.9.  Age 65+ with BMI <23 or >30 (Optional):970557}    She reports that she has never smoked. She has never used smokeless tobacco.      Counseling Resources:  ATP IV Guidelines  Pooled Cohorts Equation Calculator  Breast Cancer Risk Calculator  BRCA-Related Cancer Risk Assessment: FHS-7 Tool  FRAX Risk Assessment  ICSI Preventive Guidelines  Dietary Guidelines for " Americans, 2010  USDA's MyPlate  ASA Prophylaxis  Lung CA Screening    Skyla Ocasio MD  [facility]

## 2022-10-24 RX ORDER — LECITHIN 1000 MG
TABLET,CHEWABLE ORAL
COMMUNITY
End: 2023-08-09

## 2022-10-24 RX ORDER — LETROZOLE 2.5 MG/1
TABLET, FILM COATED ORAL
COMMUNITY
Start: 2022-04-14 | End: 2022-10-25

## 2022-10-24 ASSESSMENT — ENCOUNTER SYMPTOMS
HEARTBURN: 0
CHILLS: 0
MYALGIAS: 0
SORE THROAT: 0
FEVER: 0
HEADACHES: 0
FREQUENCY: 0
NERVOUS/ANXIOUS: 0
DIARRHEA: 0
SHORTNESS OF BREATH: 0
ABDOMINAL PAIN: 0
DYSURIA: 0
PALPITATIONS: 0
BREAST MASS: 0
CONSTIPATION: 0
JOINT SWELLING: 0
HEMATOCHEZIA: 0
DIZZINESS: 0
HEMATURIA: 0
COUGH: 0
ARTHRALGIAS: 0
NAUSEA: 0
WEAKNESS: 0
EYE PAIN: 0
PARESTHESIAS: 0
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
## 2022-10-25 ENCOUNTER — OFFICE VISIT (OUTPATIENT)
Dept: FAMILY MEDICINE | Facility: CLINIC | Age: 37
End: 2022-10-25

## 2022-10-25 VITALS
WEIGHT: 128.2 LBS | SYSTOLIC BLOOD PRESSURE: 112 MMHG | HEIGHT: 63 IN | DIASTOLIC BLOOD PRESSURE: 76 MMHG | OXYGEN SATURATION: 95 % | HEART RATE: 98 BPM | BODY MASS INDEX: 22.71 KG/M2

## 2022-10-25 DIAGNOSIS — Z00.00 ROUTINE GENERAL MEDICAL EXAMINATION AT A HEALTH CARE FACILITY: Primary | ICD-10-CM

## 2022-10-25 DIAGNOSIS — Z85.828 HX OF SKIN CANCER, BASAL CELL: ICD-10-CM

## 2022-10-25 PROBLEM — Z98.891 H/O: C-SECTION: Status: RESOLVED | Noted: 2021-03-29 | Resolved: 2022-10-25

## 2022-10-25 PROBLEM — Z12.4 CERVICAL CANCER SCREENING: Status: RESOLVED | Noted: 2019-09-06 | Resolved: 2022-10-25

## 2022-10-25 PROBLEM — N86 ECTROPION OF CERVIX: Status: RESOLVED | Noted: 2018-03-29 | Resolved: 2022-10-25

## 2022-10-25 PROBLEM — Z87.42 H/O ABNORMAL CERVICAL PAPANICOLAOU SMEAR: Status: RESOLVED | Noted: 2021-03-29 | Resolved: 2022-10-25

## 2022-10-25 PROBLEM — R39.15 URINARY URGENCY: Status: RESOLVED | Noted: 2019-10-31 | Resolved: 2022-10-25

## 2022-10-25 LAB
CHOL/HDL RATIO (RMG): 2.3 MG/DL (ref 0–4.5)
CHOLESTEROL: 177 MG/DL (ref 100–199)
GLUCOSE (RMG): 87 MG/DL (ref 65–99)
HDL (RMG): 75 MG/DL (ref 40–?)
LDL CALCULATED (RMG): 89 MG/DL (ref 0–130)
TRIGLYCERIDES (RMG): 61 MG/DL (ref 0–149)

## 2022-10-25 PROCEDURE — 80061 LIPID PANEL: CPT | Mod: QW | Performed by: FAMILY MEDICINE

## 2022-10-25 PROCEDURE — 99385 PREV VISIT NEW AGE 18-39: CPT | Performed by: FAMILY MEDICINE

## 2022-10-25 PROCEDURE — 36415 COLL VENOUS BLD VENIPUNCTURE: CPT | Performed by: FAMILY MEDICINE

## 2022-10-25 PROCEDURE — 82947 ASSAY GLUCOSE BLOOD QUANT: CPT | Mod: QW | Performed by: FAMILY MEDICINE

## 2023-05-24 ENCOUNTER — TRANSFERRED RECORDS (OUTPATIENT)
Dept: FAMILY MEDICINE | Facility: CLINIC | Age: 38
End: 2023-05-24

## 2023-08-09 ENCOUNTER — OFFICE VISIT (OUTPATIENT)
Dept: FAMILY MEDICINE | Facility: CLINIC | Age: 38
End: 2023-08-09

## 2023-08-09 VITALS
SYSTOLIC BLOOD PRESSURE: 124 MMHG | WEIGHT: 130.8 LBS | BODY MASS INDEX: 23.18 KG/M2 | DIASTOLIC BLOOD PRESSURE: 82 MMHG | HEART RATE: 74 BPM | HEIGHT: 63 IN | OXYGEN SATURATION: 100 %

## 2023-08-09 DIAGNOSIS — N89.8 VAGINAL IRRITATION: Primary | ICD-10-CM

## 2023-08-09 PROCEDURE — 99213 OFFICE O/P EST LOW 20 MIN: CPT | Performed by: FAMILY MEDICINE

## 2023-08-09 RX ORDER — FLUCONAZOLE 150 MG/1
150 TABLET ORAL ONCE
Qty: 2 TABLET | Refills: 0 | Status: SHIPPED | OUTPATIENT
Start: 2023-08-09 | End: 2023-08-09

## 2023-08-09 RX ORDER — FLUTICASONE PROPIONATE 50 MCG
1 SPRAY, SUSPENSION (ML) NASAL PRN
COMMUNITY

## 2023-08-09 NOTE — PROGRESS NOTES
"SUBJECTIVE:    Kasia Bhardwaj, is a 38 year old female presenting for the below:     1. 1 to 2 month h/o post coital bleeding. Initially occasional, now with every occurrence. Associated with shallow dyspareunia. Feels like a paper cut type sensation just at entrance to introitus posteriorly. Identifies this as source of bleeding. Associated with itching intermittently. Endorses adequate lubrication during intercourse.     Cycle length 35 days. Bleeds for 6. No change in cycle.     LMP 7/20/2023. Trying to conceive for last 2 years.     OBJECTIVE:  Vitals:    08/09/23 0923   BP: 124/82   Pulse: 74   SpO2: 100%   Weight: 59.3 kg (130 lb 12.8 oz)   Height: 1.594 m (5' 2.75\")    Body mass index is 23.36 kg/m .    General: no acute distress, cooperative with exam.  Genitourinary: normal external genitalia, vulva, vagina, except small patch of erythematous mucosa at posterior introitus. No ulceration. No rash. No skin breaks.     ASSESSMENT / PLAN:      Vaginal irritation  After discussion, will treat for possible localized vaginal candidiasis causing this area to become irritated. Will abstain from SI for 2 weeks.   -     fluconazole (DIFLUCAN) 150 MG tablet; Take 1 tablet (150 mg) by mouth once for 1 dose Take second tablet after 7 days.          "

## 2023-12-02 ENCOUNTER — HEALTH MAINTENANCE LETTER (OUTPATIENT)
Age: 38
End: 2023-12-02

## 2023-12-06 ASSESSMENT — ENCOUNTER SYMPTOMS
DIARRHEA: 0
COUGH: 0
DIZZINESS: 0
CONSTIPATION: 0
FEVER: 0
NAUSEA: 0
EYE PAIN: 0
HEMATOCHEZIA: 0
ABDOMINAL PAIN: 0
JOINT SWELLING: 0
PALPITATIONS: 0
FREQUENCY: 0
DYSURIA: 0
WEAKNESS: 0
PARESTHESIAS: 0
MYALGIAS: 0
HEADACHES: 0
HEARTBURN: 0
NERVOUS/ANXIOUS: 0
SHORTNESS OF BREATH: 0
SORE THROAT: 0
HEMATURIA: 0
ARTHRALGIAS: 0
CHILLS: 0
BREAST MASS: 0

## 2023-12-12 NOTE — PROGRESS NOTES
"Female Preventive Health Visit    SUBJECTIVE:    This 38 year old female presents for a routine preventive physical exam.    The patient has the following concerns:     1. Chronic insomnia. Excellent sleep hygiene. No issues with sleep initiation. Wakes between 3am and 5 am. Unable to fall back asleep. Prior anxiety related thoughts. Not currently. In bed by 10pm. Alarm goes off by 6 pm. Melatonin and unisom.     Patient's medications, allergies, past medical, surgical and family histories were reviewed and updated as appropriate.    OB/Gyn History:      Last Pap Smear:  UTD.  Current Contraceptive Method:  currently using condoms. Combined OCP with emotional effects. Prefers no hormones.       Health Maintenance:  Health maintenance alerts were reviewed and updated as appropriate.  Breast cancer screening: due first age age 40.     Healthy Habits:  Answers submitted by the patient for this visit:  Annual Preventive Visit (Submitted on 12/6/2023)  Chief Complaint: Annual Exam:  Frequency of exercise:: 4-5 days/week  Getting at least 3 servings of Calcium per day:: Yes  Diet:: Regular (no restrictions)  Taking medications regularly:: Not Applicable  Medication side effects:: Not applicable  Bi-annual eye exam:: Yes  Dental care twice a year:: Yes  Sleep apnea or symptoms of sleep apnea:: None  Additional concerns today:: Yes  Exercise outside of work (Submitted on 12/6/2023)  Chief Complaint: Annual Exam:  Duration of exercise:: 30-45 minutes      OBJECTIVE:  Vitals:    12/13/23 0925   BP: 106/72   Pulse: 78   SpO2: 100%   Weight: 60.3 kg (133 lb)   Height: 1.594 m (5' 2.75\")    Body mass index is 23.75 kg/m .  General: no acute distress, cooperative with exam.  HEENT:  PERRLA. Bilateral TM's, external canals, oropharynx normal.    Neck:  Supple, no lymphadenopathy or thyromegaly   Lungs: clear to auscultation bilaterally, normal respiratory effort.  Heart:  RRR without murmurs, rubs or gallops.  Normal S1 and " S2.  Abdomen: normal bowel sounds, nontender, no palpable organomegaly.  Skin:  No lesions.  No rashes.  Extremities: warm, perfused, no swelling or edema.  Neuro:  CN II-XII intact, motor & sensory function all intact.    Psych: mental status normal, mood and affect appropriate.        3/29/2021     8:53 AM   PHQ   PHQ-9 Total Score 2   Q9: Thoughts of better off dead/self-harm past 2 weeks Not at all       ASSESSMENT / PLAN:  This 38 year old female presents for a routine preventive physical exam. Preventive health topics discussed including adequate exercise and healthy diet. Return to clinic in one year for preventative exam or sooner with any other concerns.  Other issues discussed as noted below.    Routine general medical examination at a health care facility  Lipid and A1c testing last year within normal limits. No change in baseline health / risk factors.    Encounter for contraceptive management, unspecified type  After discussion interested in copper IUD. Will schedule for placement.  Appointments in Next Year        Jan 04, 2024  3:30 PM  Office Visit with Skyla Ocasio MD,  PROCEDURE ROOM  [facility] ([facility]) [phone]            Primary insomnia  Excellent sleep hygiene. Using melatonin 3 mg and unisom with good effect. Declines sleep psychology referral. Monitor for now.

## 2023-12-13 ENCOUNTER — OFFICE VISIT (OUTPATIENT)
Dept: FAMILY MEDICINE | Facility: CLINIC | Age: 38
End: 2023-12-13

## 2023-12-13 VITALS
WEIGHT: 133 LBS | DIASTOLIC BLOOD PRESSURE: 72 MMHG | BODY MASS INDEX: 23.57 KG/M2 | HEART RATE: 78 BPM | OXYGEN SATURATION: 100 % | HEIGHT: 63 IN | SYSTOLIC BLOOD PRESSURE: 106 MMHG

## 2023-12-13 DIAGNOSIS — Z30.9 ENCOUNTER FOR CONTRACEPTIVE MANAGEMENT, UNSPECIFIED TYPE: ICD-10-CM

## 2023-12-13 DIAGNOSIS — F51.01 PRIMARY INSOMNIA: ICD-10-CM

## 2023-12-13 DIAGNOSIS — Z00.00 ROUTINE GENERAL MEDICAL EXAMINATION AT A HEALTH CARE FACILITY: Primary | ICD-10-CM

## 2023-12-13 PROCEDURE — 99395 PREV VISIT EST AGE 18-39: CPT | Performed by: FAMILY MEDICINE

## 2023-12-13 RX ORDER — ASPIRIN 325 MG
TABLET ORAL
COMMUNITY
Start: 2023-09-01

## 2023-12-13 RX ORDER — ADHESIVE TAPE 3"X 2.3 YD
TAPE, NON-MEDICATED TOPICAL
COMMUNITY

## 2024-01-04 ENCOUNTER — OFFICE VISIT (OUTPATIENT)
Dept: FAMILY MEDICINE | Facility: CLINIC | Age: 39
End: 2024-01-04

## 2024-01-04 VITALS
SYSTOLIC BLOOD PRESSURE: 134 MMHG | BODY MASS INDEX: 24.17 KG/M2 | HEIGHT: 63 IN | WEIGHT: 136.4 LBS | DIASTOLIC BLOOD PRESSURE: 88 MMHG | HEART RATE: 85 BPM | OXYGEN SATURATION: 100 %

## 2024-01-04 DIAGNOSIS — Z30.430 ENCOUNTER FOR IUD INSERTION: Primary | ICD-10-CM

## 2024-01-04 LAB — HCG UR QL: NEGATIVE

## 2024-01-04 PROCEDURE — 58300 INSERT INTRAUTERINE DEVICE: CPT | Mod: 52 | Performed by: FAMILY MEDICINE

## 2024-01-04 PROCEDURE — 84703 CHORIONIC GONADOTROPIN ASSAY: CPT | Performed by: FAMILY MEDICINE

## 2024-01-04 NOTE — PROGRESS NOTES
IUD Intrauterine Device Insertion Procedure Note    PRE-OP DIAGNOSIS: desired long-term, reversible contraception     IUD type: Paraguard     Pre procedure urine pregnancy test : Negative    Written consent was obtained for the procedure.     PROCEDURE:   The speculum was placed. The vagina and cervix was sterilized in the usual manner and sterile technique was maintained throughout the course of the procedure. A single toothed tenaculum was applied to the anterior lip of the cervix and gentle traction applied to straighten and  stabilize it. Attempts were made to pass through the internal os with the sound and os finder, but were not successful. The procedure was therefore aborted.     Encounter for IUD insertion  Failed insertion. Unable to pass through internal os with sound or os finder. Appears stenosed. Will refer to GYN to attempt conception.   -     HCG urine ([facility])  -     INSERTION INTRAUTERINE DEVICE  -     Ob/Gyn  Referral; Future

## 2024-02-10 ENCOUNTER — HEALTH MAINTENANCE LETTER (OUTPATIENT)
Age: 39
End: 2024-02-10

## 2024-08-26 ENCOUNTER — HOSPITAL ENCOUNTER (OUTPATIENT)
Dept: MAMMOGRAPHY | Facility: CLINIC | Age: 39
Discharge: HOME | End: 2024-08-26
Attending: FAMILY MEDICINE | Admitting: FAMILY MEDICINE
Payer: COMMERCIAL

## 2024-08-26 DIAGNOSIS — Z12.31 VISIT FOR SCREENING MAMMOGRAM: ICD-10-CM

## 2024-08-26 PROCEDURE — 77063 BREAST TOMOSYNTHESIS BI: CPT

## 2025-01-30 SDOH — HEALTH STABILITY: PHYSICAL HEALTH: ON AVERAGE, HOW MANY MINUTES DO YOU ENGAGE IN EXERCISE AT THIS LEVEL?: 20 MIN

## 2025-01-30 SDOH — HEALTH STABILITY: PHYSICAL HEALTH: ON AVERAGE, HOW MANY DAYS PER WEEK DO YOU ENGAGE IN MODERATE TO STRENUOUS EXERCISE (LIKE A BRISK WALK)?: 4 DAYS

## 2025-01-30 ASSESSMENT — SOCIAL DETERMINANTS OF HEALTH (SDOH): HOW OFTEN DO YOU GET TOGETHER WITH FRIENDS OR RELATIVES?: ONCE A WEEK

## 2025-02-05 NOTE — PROGRESS NOTES
Female Preventive Health Visit    SUBJECTIVE:    This 40 year old female presents for a routine preventive physical exam.    The patient has the following concerns:     - palpitations. Minimal episodes. During times of stress. Not triggered by exertion (works out most days). Has not occurred in many months. No associated symptoms.       Patient's medications, allergies, past medical, surgical and family histories were reviewed and updated as appropriate.    OB/Gyn History:    Last Pap Smear:  due  Current Contraceptive Method:  Failed copper iud insertion 2024 : ultimately decided against pursuing insertion with OBGyn. Combined OCP with emotional effects. Prefers no hormones. Sexually active without contraception with  : ok if conceives, but not actively trying.       Health Maintenance:  Health maintenance alerts were reviewed and updated as appropriate.  Breast cancer screening: UTD.           1/30/2025   General Health   How would you rate your overall physical health? Excellent   Feel stress (tense, anxious, or unable to sleep) To some extent   (!) STRESS CONCERN      1/30/2025   Nutrition   Three or more servings of calcium each day? Yes   Diet: Regular (no restrictions)   How many servings of fruit and vegetables per day? 4 or more   How many sweetened beverages each day? 0-1         1/30/2025   Exercise   Days per week of moderate/strenous exercise 4 days   Average minutes spent exercising at this level 20 min         1/30/2025   Social Factors   Frequency of gathering with friends or relatives Once a week   Worry food won't last until get money to buy more No   Food not last or not have enough money for food? No   Do you have housing? (Housing is defined as stable permanent housing and does not include staying ouside in a car, in a tent, in an abandoned building, in an overnight shelter, or couch-surfing.) Yes   Are you worried about losing your housing? No   Lack of transportation? No   Unable to get  "utilities (heat,electricity)? No         1/30/2025   Dental   Dentist two times every year? Yes         1/30/2025   TB Screening   Were you born outside of the [place]? No         Today's PHQ-2 Score:       1/30/2025     9:01 AM   PHQ-2 ( 1999 [employer])   Q1: Little interest or pleasure in doing things 0   Q2: Feeling down, depressed or hopeless 1   PHQ-2 Score 1    Q1: Little interest or pleasure in doing things Not at all   Q2: Feeling down, depressed or hopeless Several days   PHQ-2 Score 1       Patient-reported           1/30/2025   Substance Use   Alcohol more than 3/day or more than 7/wk No   Do you use any other substances recreationally? (!) CANNABIS PRODUCTS           8/26/2024   LAST FHS-7 RESULTS   1st degree relative breast or ovarian cancer No   Any relative bilateral breast cancer No   Any male have breast cancer No   Any ONE woman have BOTH breast AND ovarian cancer No   Any woman with breast cancer before 50yrs No   2 or more relatives with breast AND/OR ovarian cancer No   2 or more relatives with breast AND/OR bowel cancer No             1/30/2025   STI Screening   New sexual partner(s) since last STI/HIV test? No           1/30/2025   Contraception/Family Planning   Questions about contraception or family planning No            OBJECTIVE:  Vitals:    02/06/25 0822   BP: 118/83   Pulse: 92   SpO2: 99%   Weight: 61.9 kg (136 lb 6.4 oz)   Height: 1.607 m (5' 3.25\")    Body mass index is 23.97 kg/m .  General: no acute distress, cooperative with exam.  HEENT:  PERRLA. Bilateral TM's, external canals, oropharynx normal.    Neck:  Supple, no lymphadenopathy or thyromegaly   Lungs: clear to auscultation bilaterally, normal respiratory effort.  Heart:  RRR without murmurs, rubs or gallops.  Normal S1 and S2.  Abdomen: normal bowel sounds, nontender, no palpable organomegaly.  Genitourinary: normal external genitalia, vulva, vagina.   Skin:  No lesions.  No rashes.  Extremities: warm, perfused, no swelling or " edema.  Neuro:  CN II-XII intact, motor & sensory function all intact.    Psych: mental status normal, mood and affect appropriate.        3/29/2021     8:53 AM   PHQ   PHQ-9 Total Score 2   Q9: Thoughts of better off dead/self-harm past 2 weeks Not at all       ASSESSMENT / PLAN:  This 40 year old female presents for a routine preventive physical exam. Preventive health topics discussed including adequate exercise and healthy diet. Return to clinic in one year for preventative exam or sooner with any other concerns.  Other issues discussed as noted below.    Kasia was seen today for physical and health maintenance.    Diagnoses and all orders for this visit:    Routine general medical examination at a health care facility  -     Lipid Profile ([facility])  -     VENOUS COLLECTION  -     Basic metabolic panel; Future    Cervical cancer screening  -     HPV and Gynecologic Cytology Panel; Future    Primary insomnia  Excellent sleep hygiene. No issues with sleep initiation. Wakes between 3am and 5 am. Unable to fall back asleep. Prior anxiety related thoughts. Not currently. In bed by 10pm. Alarm goes off by 6 pm. Melatonin and Unisom with varying effect. Stable. Chronic.     Breast cancer screening by mammogram  Order given for mammogram due August.   -     MA Screen Bilateral w/Christiano; Future    Palpitations  Screen overactive thyroid.   Reasons to rct discuss. Has not happened in many months.   Consider leadless monitoring if re occurs.   -     TSH; Future        The longitudinal plan of care for the diagnosis(es)/condition(s) as documented were addressed during this visit. Due to the added complexity in care, I will continue to support Kasia in the subsequent management and with ongoing continuity of care.

## 2025-02-05 NOTE — PATIENT INSTRUCTIONS
Patient Education   Preventive Care Advice   This is general advice given by our system to help you stay healthy. However, your care team may have specific advice just for you. Please talk to your care team about your preventive care needs.  Nutrition  Eat 5 or more servings of fruits and vegetables each day.  Try wheat bread, brown rice and whole grain pasta (instead of white bread, rice, and pasta).  Get enough calcium and vitamin D. Check the label on foods and aim for 100% of the RDA (recommended daily allowance).  Lifestyle  Exercise at least 150 minutes each week  (30 minutes a day, 5 days a week).  Do muscle strengthening activities 2 days a week. These help control your weight and prevent disease.  No smoking.  Wear sunscreen to prevent skin cancer.  Have a dental exam and cleaning every 6 months.  Yearly exams  See your health care team every year to talk about:  Any changes in your health.  Any medicines your care team has prescribed.  Preventive care, family planning, and ways to prevent chronic diseases.  Shots (vaccines)   HPV shots (up to age 26), if you've never had them before.  Hepatitis B shots (up to age 59), if you've never had them before.  COVID-19 shot: Get this shot when it's due.  Flu shot: Get a flu shot every year.  Tetanus shot: Get a tetanus shot every 10 years.  Pneumococcal, hepatitis A, and RSV shots: Ask your care team if you need these based on your risk.  Shingles shot (for age 50 and up)  General health tests  Diabetes screening:  Starting at age 35, Get screened for diabetes at least every 3 years.  If you are younger than age 35, ask your care team if you should be screened for diabetes.  Cholesterol test: At age 39, start having a cholesterol test every 5 years, or more often if advised.  Bone density scan (DEXA): At age 50, ask your care team if you should have this scan for osteoporosis (brittle bones).  Hepatitis C: Get tested at least once in your life.  STIs (sexually  transmitted infections)  Before age 24: Ask your care team if you should be screened for STIs.  After age 24: Get screened for STIs if you're at risk. You are at risk for STIs (including HIV) if:  You are sexually active with more than one person.  You don't use condoms every time.  You or a partner was diagnosed with a sexually transmitted infection.  If you are at risk for HIV, ask about PrEP medicine to prevent HIV.  Get tested for HIV at least once in your life, whether you are at risk for HIV or not.  Cancer screening tests  Cervical cancer screening: If you have a cervix, begin getting regular cervical cancer screening tests starting at age 21.  Breast cancer scan (mammogram): If you've ever had breasts, begin having regular mammograms starting at age 40. This is a scan to check for breast cancer.  Colon cancer screening: It is important to start screening for colon cancer at age 45.  Have a colonoscopy test every 10 years (or more often if you're at risk) Or, ask your provider about stool tests like a FIT test every year or Cologuard test every 3 years.  To learn more about your testing options, visit:   .  For help making a decision, visit:   https://bit.ly/to61980.  Prostate cancer screening test: If you have a prostate, ask your care team if a prostate cancer screening test (PSA) at age 55 is right for you.  Lung cancer screening: If you are a current or former smoker ages 50 to 80, ask your care team if ongoing lung cancer screenings are right for you.  For informational purposes only. Not to replace the advice of your health care provider. Copyright   2023 [facility]. All rights reserved. Clinically reviewed by the [facility]. [employer] [id number] - REV 01/24.  Learning About Stress  What is stress?     Stress is your body's response to a hard situation. Your body can have a physical, emotional, or mental response. Stress is a fact of life for most people, and it  affects everyone differently. What causes stress for you may not be stressful for someone else.  A lot of things can cause stress. You may feel stress when you go on a job interview, take a test, or run a race. This kind of short-term stress is normal and even useful. It can help you if you need to work hard or react quickly. For example, stress can help you finish an important job on time.  Long-term stress is caused by ongoing stressful situations or events. Examples of long-term stress include long-term health problems, ongoing problems at work, or conflicts in your family. Long-term stress can harm your health.  How does stress affect your health?  When you are stressed, your body responds as though you are in danger. It makes hormones that speed up your heart, make you breathe faster, and give you a burst of energy. This is called the fight-or-flight stress response. If the stress is over quickly, your body goes back to normal and no harm is done.  But if stress happens too often or lasts too long, it can have bad effects. Long-term stress can make you more likely to get sick, and it can make symptoms of some diseases worse. If you tense up when you are stressed, you may develop neck, shoulder, or low back pain. Stress is linked to high blood pressure and heart disease.  Stress also harms your emotional health. It can make you pro, tense, or depressed. Your relationships may suffer, and you may not do well at work or school.  What can you do to manage stress?  You can try these things to help manage stress:   Do something active. Exercise or activity can help reduce stress. Walking is a great way to get started. Even everyday activities such as housecleaning or yard work can help.  Try yoga or vahe chi. These techniques combine exercise and meditation. You may need some training at first to learn them.  Do something you enjoy. For example, listen to music or go to a movie. Practice your hobby or do volunteer  "work.  Meditate. This can help you relax, because you are not worrying about what happened before or what may happen in the future.  Do guided imagery. Imagine yourself in any setting that helps you feel calm. You can use online videos, books, or a teacher to guide you.  Do breathing exercises. For example:  From a standing position, bend forward from the waist with your knees slightly bent. Let your arms dangle close to the floor.  Breathe in slowly and deeply as you return to a standing position. Roll up slowly and lift your head last.  Hold your breath for just a few seconds in the standing position.  Breathe out slowly and bend forward from the waist.  Let your feelings out. Talk, laugh, cry, and express anger when you need to. Talking with supportive friends or family, a counselor, or a patricia leader about your feelings is a healthy way to relieve stress. Avoid discussing your feelings with people who make you feel worse.  Write. It may help to write about things that are bothering you. This helps you find out how much stress you feel and what is causing it. When you know this, you can find better ways to cope.  What can you do to prevent stress?  You might try some of these things to help prevent stress:  Manage your time. This helps you find time to do the things you want and need to do.  Get enough sleep. Your body recovers from the stresses of the day while you are sleeping.  Get support. Your family, friends, and community can make a difference in how you experience stress.  Limit your news feed. Avoid or limit time on social media or news that may make you feel stressed.  Do something active. Exercise or activity can help reduce stress. Walking is a great way to get started.  Where can you learn more?  Go to https://www.maufait.net/patiented  Enter N032 in the search box to learn more about \"Learning About Stress.\"  Current as of: October 24, 2023  Content Version: 14.3    2024 [employer]. "   Care instructions adapted under license by your healthcare professional. If you have questions about a medical condition or this instruction, always ask your healthcare professional. [employer] disclaims any warranty or liability for your use of this information.    Substance Use Disorder: Care Instructions  Overview     You can improve your life and health by stopping your use of alcohol or drugs. When you don't drink or use drugs, you may feel and sleep better. You may get along better with your family, friends, and coworkers. There are medicines and programs that can help with substance use disorder.  How can you care for yourself at home?  Here are some ways to help you stay sober and prevent relapse.  If you have been given medicine to help keep you sober or reduce your cravings, be sure to take it exactly as prescribed.  Talk to your doctor about programs that can help you stop using drugs or drinking alcohol.  Do not keep alcohol or drugs in your home.  Plan ahead. Think about what you'll say if other people ask you to drink or use drugs. Try not to spend time with people who drink or use drugs.  Use the time and money spent on drinking or drugs to do something that's important to you.  Preventing a relapse  Have a plan to deal with relapse. Learn to recognize changes in your thinking that lead you to drink or use drugs. Get help before you start to drink or use drugs again.  Try to stay away from situations, friends, or places that may lead you to drink or use drugs.  If you feel the need to drink alcohol or use drugs again, seek help right away. Call a trusted friend or family member. Some people get support from organizations such as Narcotics Anonymous or [employer] or from treatment facilities.  If you relapse, get help as soon as you can. Some people make a plan with another person that outlines what they want that person to do for them if they relapse. The plan usually includes how to  handle the relapse and who to notify in case of relapse.  Don't give up. Remember that a relapse doesn't mean that you have failed. Use the experience to learn the triggers that lead you to drink or use drugs. Then quit again. Recovery is a lifelong process. Many people have several relapses before they are able to quit for good.  Follow-up care is a key part of your treatment and safety. Be sure to make and go to all appointments, and call your doctor if you are having problems. It's also a good idea to know your test results and keep a list of the medicines you take.  When should you call for help?   Call 911  anytime you think you may need emergency care. For example, call if you or someone else:    Has overdosed or has withdrawal signs. Be sure to tell the emergency workers that you are or someone else is using or trying to quit using drugs. Overdose or withdrawal signs may include:  Losing consciousness.  Seizure.  Seeing or hearing things that aren't there (hallucinations).     Is thinking or talking about suicide or harming others.   Where to get help 24 hours a day, 7 days a week   If you or someone you know talks about suicide, self-harm, a mental health crisis, a substance use crisis, or any other kind of emotional distress, get help right away. You can:    Call the Suicide and Crisis Lifeline at 436.     Call 3-233-109-PZWQ (1-988.385.9548).     Text HOME to 730675 to access the Crisis Text Line.   Consider saving these numbers in your phone.  Go to [email or web address] for more information or to chat online.  Call your doctor now or seek immediate medical care if:    You are having withdrawal symptoms. These may include nausea or vomiting, sweating, shakiness, and anxiety.   Watch closely for changes in your health, and be sure to contact your doctor if:    You have a relapse.     You need more help or support to stop.   Where can you learn more?  Go to https://www.healthwise.net/patiented  Enter H573 in the  "search box to learn more about \"Substance Use Disorder: Care Instructions.\"  Current as of: November 15, 2023  Content Version: 14.3    2024 [employer].   Care instructions adapted under license by your healthcare professional. If you have questions about a medical condition or this instruction, always ask your healthcare professional. [employer] disclaims any warranty or liability for your use of this information.       "

## 2025-02-06 ENCOUNTER — OFFICE VISIT (OUTPATIENT)
Dept: FAMILY MEDICINE | Facility: CLINIC | Age: 40
End: 2025-02-06

## 2025-02-06 VITALS
BODY MASS INDEX: 24.17 KG/M2 | HEIGHT: 63 IN | OXYGEN SATURATION: 99 % | HEART RATE: 92 BPM | SYSTOLIC BLOOD PRESSURE: 118 MMHG | DIASTOLIC BLOOD PRESSURE: 83 MMHG | WEIGHT: 136.4 LBS

## 2025-02-06 DIAGNOSIS — Z12.31 BREAST CANCER SCREENING BY MAMMOGRAM: ICD-10-CM

## 2025-02-06 DIAGNOSIS — Z00.00 ROUTINE GENERAL MEDICAL EXAMINATION AT A HEALTH CARE FACILITY: Primary | ICD-10-CM

## 2025-02-06 DIAGNOSIS — R00.2 PALPITATIONS: ICD-10-CM

## 2025-02-06 DIAGNOSIS — Z12.4 CERVICAL CANCER SCREENING: ICD-10-CM

## 2025-02-06 DIAGNOSIS — F51.01 PRIMARY INSOMNIA: ICD-10-CM

## 2025-02-06 LAB
ANION GAP SERPL CALCULATED.3IONS-SCNC: 12 MMOL/L (ref 7–15)
BUN SERPL-MCNC: 9.2 MG/DL (ref 6–20)
CALCIUM SERPL-MCNC: 9.5 MG/DL (ref 8.8–10.4)
CHLORIDE SERPL-SCNC: 102 MMOL/L (ref 98–107)
CHOLESTEROL: 139 MG/DL (ref 100–199)
CREAT SERPL-MCNC: 0.72 MG/DL (ref 0.51–0.95)
EGFRCR SERPLBLD CKD-EPI 2021: >90 ML/MIN/1.73M2
FASTING STATUS PATIENT QL REPORTED: NO
FASTING?: NO
GLUCOSE SERPL-MCNC: 72 MG/DL (ref 70–99)
HCO3 SERPL-SCNC: 28 MMOL/L (ref 22–29)
HDL (RMG): 56 MG/DL (ref 40–?)
LDL CALCULATED (RMG): 64 MG/DL (ref 0–130)
POTASSIUM SERPL-SCNC: 4.1 MMOL/L (ref 3.4–5.3)
SODIUM SERPL-SCNC: 142 MMOL/L (ref 135–145)
TRIGLYCERIDES (RMG): 98 MG/DL (ref 0–149)
TSH SERPL DL<=0.005 MIU/L-ACNC: 1.93 UIU/ML (ref 0.3–4.2)

## 2025-02-06 PROCEDURE — 82565 ASSAY OF CREATININE: CPT | Performed by: FAMILY MEDICINE

## 2025-02-06 PROCEDURE — 80048 BASIC METABOLIC PNL TOTAL CA: CPT | Performed by: FAMILY MEDICINE

## 2025-02-06 PROCEDURE — 84443 ASSAY THYROID STIM HORMONE: CPT | Performed by: FAMILY MEDICINE

## 2025-02-06 PROCEDURE — 36415 COLL VENOUS BLD VENIPUNCTURE: CPT | Performed by: FAMILY MEDICINE

## 2025-02-06 PROCEDURE — 80048 BASIC METABOLIC PNL TOTAL CA: CPT | Mod: 90 | Performed by: FAMILY MEDICINE

## 2025-02-06 PROCEDURE — 99213 OFFICE O/P EST LOW 20 MIN: CPT | Mod: 25 | Performed by: FAMILY MEDICINE

## 2025-02-06 PROCEDURE — 80061 LIPID PANEL: CPT | Mod: QW | Performed by: FAMILY MEDICINE

## 2025-02-06 PROCEDURE — 84443 ASSAY THYROID STIM HORMONE: CPT | Mod: 90 | Performed by: FAMILY MEDICINE

## 2025-02-06 PROCEDURE — 99396 PREV VISIT EST AGE 40-64: CPT | Performed by: FAMILY MEDICINE

## 2025-02-06 PROCEDURE — 82310 ASSAY OF CALCIUM: CPT | Performed by: FAMILY MEDICINE

## 2025-02-06 RX ORDER — MULTIVITAMIN WITH IRON
1 TABLET ORAL DAILY
COMMUNITY

## 2025-02-11 LAB
BKR AP ASSOCIATED HPV REPORT: NORMAL
BKR LAB AP GYN ADEQUACY: NORMAL
BKR LAB AP GYN INTERPRETATION: NORMAL
BKR LAB AP PREVIOUS ABNORMAL: NORMAL
PATH REPORT.COMMENTS IMP SPEC: NORMAL
PATH REPORT.COMMENTS IMP SPEC: NORMAL
PATH REPORT.RELEVANT HX SPEC: NORMAL

## 2025-07-23 ENCOUNTER — OFFICE VISIT (OUTPATIENT)
Dept: FAMILY MEDICINE | Facility: CLINIC | Age: 40
End: 2025-07-23

## 2025-07-23 VITALS
DIASTOLIC BLOOD PRESSURE: 80 MMHG | BODY MASS INDEX: 23.8 KG/M2 | SYSTOLIC BLOOD PRESSURE: 122 MMHG | HEART RATE: 72 BPM | WEIGHT: 135.4 LBS | OXYGEN SATURATION: 100 %

## 2025-07-23 DIAGNOSIS — N92.0 MENORRHAGIA WITH REGULAR CYCLE: Primary | ICD-10-CM

## 2025-07-23 LAB
% GRANULOCYTES: 63.8 % (ref 42.2–75.2)
HCT VFR BLD AUTO: 40 % (ref 35–46)
HEMOGLOBIN: 14 G/DL (ref 11.8–15.5)
LYMPHOCYTES NFR BLD AUTO: 29 % (ref 20.5–51.1)
MCH RBC QN AUTO: 31.3 PG (ref 27–31)
MCHC RBC AUTO-ENTMCNC: 35 G/DL (ref 33–37)
MCV RBC AUTO: 89.3 FL (ref 80–100)
MONOCYTES NFR BLD AUTO: 7.2 % (ref 1.7–9.3)
PLATELET # BLD AUTO: 319 K/UL (ref 140–450)
RBC # BLD AUTO: 4.48 X10/CMM (ref 3.7–5.2)
TSH SERPL DL<=0.005 MIU/L-ACNC: 2.13 UIU/ML (ref 0.3–4.2)
WBC # BLD AUTO: 5.9 X10/CMM (ref 3.8–11)

## 2025-07-23 PROCEDURE — 3079F DIAST BP 80-89 MM HG: CPT | Performed by: FAMILY MEDICINE

## 2025-07-23 PROCEDURE — 85025 COMPLETE CBC W/AUTO DIFF WBC: CPT | Performed by: FAMILY MEDICINE

## 2025-07-23 PROCEDURE — 84443 ASSAY THYROID STIM HORMONE: CPT | Performed by: FAMILY MEDICINE

## 2025-07-23 PROCEDURE — 36415 COLL VENOUS BLD VENIPUNCTURE: CPT | Performed by: FAMILY MEDICINE

## 2025-07-23 PROCEDURE — 84443 ASSAY THYROID STIM HORMONE: CPT | Mod: 90 | Performed by: FAMILY MEDICINE

## 2025-07-23 PROCEDURE — 3074F SYST BP LT 130 MM HG: CPT | Performed by: FAMILY MEDICINE

## 2025-07-23 PROCEDURE — 99213 OFFICE O/P EST LOW 20 MIN: CPT | Performed by: FAMILY MEDICINE

## 2025-07-23 PROCEDURE — G2211 COMPLEX E/M VISIT ADD ON: HCPCS | Performed by: FAMILY MEDICINE

## 2025-07-23 NOTE — PROGRESS NOTES
SUBJECTIVE:    Kasia Bhardwaj, is a 40 year old female presenting for the below:     General Concern (Submitted on 7/23/2025)  Chief Complaint: Chronic problems general questions HPI Form  What is the reason for your visit today?: Menstrual bleeding  When did your symptoms begin?: More than a month  What are your symptoms?: Heavy bleeding and large clots on day 3 of period  How would you describe these symptoms?: Moderate  Are your symptoms:: Staying the same  Have you had these symptoms before?: Yes  Have you tried or received treatment for these symptoms before?: No      Heavy menses (with flooding and clots : bleeding though menstrual cup and regular tampon) last 3-4 cycles. Heaviest day 3. Bleeds for 3-4 days. Continued regular menses.  No worsening on usual menstrual cramps. Some mild sleep disruption, night sweats, vaginal dryness.     Regular unprotected sex with  : h/o infertility H/o fibroid uterus (detected at time of C section).     OBJECTIVE:  Vitals:    07/23/25 1525   BP: 122/80   Pulse: 72   SpO2: 100%   Weight: 61.4 kg (135 lb 6.4 oz)    Body mass index is 23.8 kg/m .  General: no acute distress, cooperative with exam.    ASSESSMENT / PLAN:      Menorrhagia with regular cycle  Diff Dx includes perimenopausal change in menstruation, hypothyroid, fibroid uterus.  Work up as below.   If TSH wnl and no significant fibroids, discussed options for menorrhagia related to perimenopause : COCP (mood fluctuations with this prior and prefers to avoid), cyclical tranexamic acid (prefers not to use after discussion) or placement of levonorgestrel containing IUD (has some interest).  -     CBC with Diff/Plt ([facility])  -     TSH; Future  -     US Pelvic Complete with Transvaginal; Future  -     VENOUS COLLECTION    The longitudinal plan of care for the diagnosis(es)/condition(s) as documented were addressed during this visit. Due to the added complexity in care, I will continue to support Kasia in the subsequent  management and with ongoing continuity of care.       Yes

## 2025-08-01 ENCOUNTER — ANCILLARY PROCEDURE (OUTPATIENT)
Dept: ULTRASOUND IMAGING | Facility: CLINIC | Age: 40
End: 2025-08-01
Attending: FAMILY MEDICINE
Payer: COMMERCIAL

## 2025-08-01 DIAGNOSIS — N92.0 MENORRHAGIA WITH REGULAR CYCLE: ICD-10-CM

## 2025-08-01 PROCEDURE — 76856 US EXAM PELVIC COMPLETE: CPT

## 2025-09-03 ENCOUNTER — OFFICE VISIT (OUTPATIENT)
Age: 40
End: 2025-09-03

## 2025-09-03 VITALS
SYSTOLIC BLOOD PRESSURE: 133 MMHG | HEART RATE: 76 BPM | DIASTOLIC BLOOD PRESSURE: 87 MMHG | BODY MASS INDEX: 23.83 KG/M2 | OXYGEN SATURATION: 99 % | WEIGHT: 135.6 LBS

## 2025-09-03 DIAGNOSIS — Z30.430 ENCOUNTER FOR IUD INSERTION: Primary | ICD-10-CM

## 2025-09-03 LAB — HCG UR QL: NEGATIVE

## 2025-09-03 PROCEDURE — 3075F SYST BP GE 130 - 139MM HG: CPT | Performed by: FAMILY MEDICINE

## 2025-09-03 PROCEDURE — 3079F DIAST BP 80-89 MM HG: CPT | Performed by: FAMILY MEDICINE
